# Patient Record
Sex: FEMALE | Race: WHITE | NOT HISPANIC OR LATINO | Employment: FULL TIME | URBAN - METROPOLITAN AREA
[De-identification: names, ages, dates, MRNs, and addresses within clinical notes are randomized per-mention and may not be internally consistent; named-entity substitution may affect disease eponyms.]

---

## 2018-07-20 ENCOUNTER — CLINICAL SUPPORT (OUTPATIENT)
Dept: OCCUPATIONAL MEDICINE | Facility: CLINIC | Age: 38
End: 2018-07-20

## 2018-07-20 DIAGNOSIS — Z02.83 ENCOUNTER FOR DRUG SCREENING: ICD-10-CM

## 2018-07-20 PROCEDURE — 80305 DRUG TEST PRSMV DIR OPT OBS: CPT | Mod: S$GLB,,, | Performed by: NURSE PRACTITIONER

## 2019-02-04 ENCOUNTER — OFFICE VISIT (OUTPATIENT)
Dept: OBSTETRICS AND GYNECOLOGY | Facility: CLINIC | Age: 39
End: 2019-02-04
Payer: COMMERCIAL

## 2019-02-04 VITALS
RESPIRATION RATE: 13 BRPM | HEIGHT: 62 IN | SYSTOLIC BLOOD PRESSURE: 122 MMHG | WEIGHT: 178 LBS | BODY MASS INDEX: 32.76 KG/M2 | DIASTOLIC BLOOD PRESSURE: 67 MMHG | HEART RATE: 79 BPM

## 2019-02-04 DIAGNOSIS — R45.1 AGITATION: ICD-10-CM

## 2019-02-04 DIAGNOSIS — R10.31 RIGHT LOWER QUADRANT PAIN: ICD-10-CM

## 2019-02-04 DIAGNOSIS — Z01.419 ENCOUNTER FOR GYNECOLOGICAL EXAMINATION WITHOUT ABNORMAL FINDING: Primary | ICD-10-CM

## 2019-02-04 DIAGNOSIS — Z12.4 CERVICAL CANCER SCREENING: ICD-10-CM

## 2019-02-04 PROCEDURE — 88175 CYTOPATH C/V AUTO FLUID REDO: CPT

## 2019-02-04 PROCEDURE — 99999 PR PBB SHADOW E&M-EST. PATIENT-LVL III: ICD-10-PCS | Mod: PBBFAC,,, | Performed by: OBSTETRICS & GYNECOLOGY

## 2019-02-04 PROCEDURE — 99999 PR PBB SHADOW E&M-EST. PATIENT-LVL III: CPT | Mod: PBBFAC,,, | Performed by: OBSTETRICS & GYNECOLOGY

## 2019-02-04 PROCEDURE — 99385 PR PREVENTIVE VISIT,NEW,18-39: ICD-10-PCS | Mod: S$GLB,,, | Performed by: OBSTETRICS & GYNECOLOGY

## 2019-02-04 PROCEDURE — 99385 PREV VISIT NEW AGE 18-39: CPT | Mod: S$GLB,,, | Performed by: OBSTETRICS & GYNECOLOGY

## 2019-02-04 RX ORDER — NORGESTIMATE AND ETHINYL ESTRADIOL 0.25-0.035
1 KIT ORAL DAILY
Qty: 28 TABLET | Refills: 12 | Status: SHIPPED | OUTPATIENT
Start: 2019-02-04 | End: 2021-05-07

## 2019-02-04 RX ORDER — CITALOPRAM 20 MG/1
20 TABLET, FILM COATED ORAL DAILY
Qty: 30 TABLET | Refills: 12 | Status: SHIPPED | OUTPATIENT
Start: 2019-02-04 | End: 2021-05-07

## 2019-02-04 NOTE — PROGRESS NOTES
Subjective:       Patient ID: Nakita Vaughn is a 38 y.o. female.    Chief Complaint:  Well Woman      History of Present Illness   patient presents for annual exam.  Patient admits to increasing agitation over the last several months.  Along with his she has started to notice occasional crying spells and change in her sleeping habits.  She denies any change in her dietary habits.  Patient has never had this condition for an never been on medication.  Counseling was done.  Patient is also complaining of pain which she describes as being over her ovaries.  She states she feels like her ovaries upon to fall out.  Patient states that she has much more of an issue on the right side than the left.  She states it is not only with ovulation.  She states that only last a few seconds and does not require   Pain medication.    Menstrual History:  OB History      Para Term  AB Living    3 3       3    SAB TAB Ectopic Multiple Live Births                      Menarche age:   Patient's last menstrual period was 2019 (approximate).         Review of Systems  Review of Systems   Constitutional: Negative for activity change, appetite change, chills, diaphoresis, fatigue, fever and unexpected weight change.   HENT: Negative for congestion, dental problem, drooling, ear discharge, ear pain, facial swelling, hearing loss, mouth sores, nosebleeds, postnasal drip, rhinorrhea, sinus pressure, sneezing, sore throat, tinnitus, trouble swallowing and voice change.    Eyes: Positive for visual disturbance. Negative for photophobia, pain, discharge, redness and itching.   Respiratory: Negative for apnea, cough, choking, chest tightness, shortness of breath, wheezing and stridor.    Cardiovascular: Negative for chest pain, palpitations and leg swelling.   Gastrointestinal: Negative for abdominal distention, abdominal pain, anal bleeding, blood in stool, constipation, diarrhea, nausea, rectal pain and vomiting.    Endocrine: Negative for cold intolerance, heat intolerance, polydipsia, polyphagia and polyuria.   Genitourinary: Positive for frequency and pelvic pain. Negative for decreased urine volume, difficulty urinating, dyspareunia, dysuria, enuresis, flank pain, genital sores, hematuria, menstrual problem, urgency, vaginal bleeding, vaginal discharge and vaginal pain.   Musculoskeletal: Negative for arthralgias, back pain, gait problem, joint swelling, myalgias, neck pain and neck stiffness.   Skin: Negative for color change, pallor, rash and wound.   Allergic/Immunologic: Negative for environmental allergies, food allergies and immunocompromised state.   Neurological: Negative for dizziness, tremors, seizures, syncope, facial asymmetry, speech difficulty, weakness, light-headedness, numbness and headaches.   Hematological: Negative for adenopathy. Does not bruise/bleed easily.   Psychiatric/Behavioral: Positive for agitation and sleep disturbance. Negative for behavioral problems, confusion, decreased concentration, dysphoric mood, hallucinations, self-injury and suicidal ideas. The patient is not nervous/anxious and is not hyperactive.            Objective:      Physical Exam   Constitutional: She is oriented to person, place, and time. She appears well-developed and well-nourished.   Neck: No thyromegaly present.   Cardiovascular: Normal rate and regular rhythm.   Pulmonary/Chest: Effort normal and breath sounds normal. Right breast exhibits no inverted nipple, no mass, no nipple discharge, no skin change and no tenderness. Left breast exhibits no inverted nipple, no mass, no nipple discharge, no skin change and no tenderness. Breasts are symmetrical.   Abdominal: Soft. Bowel sounds are normal. She exhibits no mass. There is no tenderness. Hernia confirmed negative in the right inguinal area and confirmed negative in the left inguinal area.   Genitourinary: Vagina normal and uterus normal. Rectal exam shows no  external hemorrhoid. No breast tenderness or discharge. Uterus is not enlarged and not tender. Cervix exhibits no motion tenderness, no discharge and no friability. Right adnexum displays no mass, no tenderness and no fullness. Left adnexum displays no mass, no tenderness and no fullness. No tenderness in the vagina. No foreign body in the vagina. No vaginal discharge found.   Musculoskeletal: Normal range of motion.   Lymphadenopathy:        Right: No inguinal adenopathy present.        Left: No inguinal adenopathy present.   Neurological: She is alert and oriented to person, place, and time. She has normal reflexes.   Skin: Skin is dry.   Psychiatric: She has a normal mood and affect. Her behavior is normal. Judgment and thought content normal.   Nursing note and vitals reviewed.          Assessment:        1. Encounter for gynecological examination without abnormal finding    2. Cervical cancer screening    3. Right lower quadrant pain    4. Agitation                Plan:         Nakita was seen today for well woman.    Diagnoses and all orders for this visit:    Encounter for gynecological examination without abnormal finding    Cervical cancer screening  -     Liquid-based pap smear, screening    Right lower quadrant pain    Agitation    Other orders  -     citalopram (CELEXA) 20 MG tablet; Take 1 tablet (20 mg total) by mouth once daily.

## 2021-04-30 ENCOUNTER — OFFICE VISIT (OUTPATIENT)
Dept: CARDIOLOGY | Facility: CLINIC | Age: 41
End: 2021-04-30
Payer: COMMERCIAL

## 2021-04-30 ENCOUNTER — HOSPITAL ENCOUNTER (OUTPATIENT)
Dept: CARDIOLOGY | Facility: HOSPITAL | Age: 41
Discharge: HOME | End: 2021-04-30
Attending: INTERNAL MEDICINE
Payer: COMMERCIAL

## 2021-04-30 VITALS
HEART RATE: 78 BPM | BODY MASS INDEX: 34.61 KG/M2 | OXYGEN SATURATION: 99 % | WEIGHT: 188.06 LBS | DIASTOLIC BLOOD PRESSURE: 84 MMHG | HEIGHT: 62 IN | SYSTOLIC BLOOD PRESSURE: 128 MMHG

## 2021-04-30 DIAGNOSIS — R51.9 GENERALIZED HEADACHES: ICD-10-CM

## 2021-04-30 DIAGNOSIS — R94.31 ABNORMAL EKG: ICD-10-CM

## 2021-04-30 DIAGNOSIS — H53.8 BLURRY VISION, BILATERAL: ICD-10-CM

## 2021-04-30 DIAGNOSIS — R07.9 CHEST PAIN SYNDROME: Primary | ICD-10-CM

## 2021-04-30 DIAGNOSIS — Z76.89 ESTABLISHING CARE WITH NEW DOCTOR, ENCOUNTER FOR: Primary | ICD-10-CM

## 2021-04-30 DIAGNOSIS — R06.02 SHORTNESS OF BREATH: ICD-10-CM

## 2021-04-30 DIAGNOSIS — R06.83 SNORING: ICD-10-CM

## 2021-04-30 DIAGNOSIS — Z76.89 ESTABLISHING CARE WITH NEW DOCTOR, ENCOUNTER FOR: ICD-10-CM

## 2021-04-30 PROCEDURE — 93005 ELECTROCARDIOGRAM TRACING: CPT

## 2021-04-30 PROCEDURE — 93010 EKG 12-LEAD: ICD-10-PCS | Mod: ,,, | Performed by: INTERNAL MEDICINE

## 2021-04-30 PROCEDURE — 99999 PR PBB SHADOW E&M-EST. PATIENT-LVL III: ICD-10-PCS | Mod: PBBFAC,,, | Performed by: INTERNAL MEDICINE

## 2021-04-30 PROCEDURE — 99204 PR OFFICE/OUTPT VISIT, NEW, LEVL IV, 45-59 MIN: ICD-10-PCS | Mod: S$GLB,,, | Performed by: INTERNAL MEDICINE

## 2021-04-30 PROCEDURE — 99999 PR PBB SHADOW E&M-EST. PATIENT-LVL III: CPT | Mod: PBBFAC,,, | Performed by: INTERNAL MEDICINE

## 2021-04-30 PROCEDURE — 93010 ELECTROCARDIOGRAM REPORT: CPT | Mod: ,,, | Performed by: INTERNAL MEDICINE

## 2021-04-30 PROCEDURE — 99204 OFFICE O/P NEW MOD 45 MIN: CPT | Mod: S$GLB,,, | Performed by: INTERNAL MEDICINE

## 2021-05-01 ENCOUNTER — LAB VISIT (OUTPATIENT)
Dept: LAB | Facility: HOSPITAL | Age: 41
End: 2021-05-01
Attending: INTERNAL MEDICINE
Payer: COMMERCIAL

## 2021-05-01 DIAGNOSIS — R94.31 ABNORMAL EKG: ICD-10-CM

## 2021-05-01 DIAGNOSIS — R06.02 SHORTNESS OF BREATH: ICD-10-CM

## 2021-05-01 DIAGNOSIS — R06.83 SNORING: ICD-10-CM

## 2021-05-01 DIAGNOSIS — R07.9 CHEST PAIN SYNDROME: ICD-10-CM

## 2021-05-01 LAB
ALBUMIN SERPL BCP-MCNC: 3.8 G/DL (ref 3.5–5.2)
ALP SERPL-CCNC: 56 U/L (ref 55–135)
ALT SERPL W/O P-5'-P-CCNC: 11 U/L (ref 10–44)
ANION GAP SERPL CALC-SCNC: 10 MMOL/L (ref 8–16)
AST SERPL-CCNC: 12 U/L (ref 10–40)
BASOPHILS # BLD AUTO: 0.04 K/UL (ref 0–0.2)
BASOPHILS NFR BLD: 0.6 % (ref 0–1.9)
BILIRUB SERPL-MCNC: 0.7 MG/DL (ref 0.1–1)
BNP SERPL-MCNC: <10 PG/ML (ref 0–99)
BUN SERPL-MCNC: 18 MG/DL (ref 6–20)
CALCIUM SERPL-MCNC: 9.4 MG/DL (ref 8.7–10.5)
CHLORIDE SERPL-SCNC: 109 MMOL/L (ref 95–110)
CHOLEST SERPL-MCNC: 169 MG/DL (ref 120–199)
CHOLEST/HDLC SERPL: 3.8 {RATIO} (ref 2–5)
CO2 SERPL-SCNC: 20 MMOL/L (ref 23–29)
CREAT SERPL-MCNC: 0.8 MG/DL (ref 0.5–1.4)
DIFFERENTIAL METHOD: NORMAL
EOSINOPHIL # BLD AUTO: 0.2 K/UL (ref 0–0.5)
EOSINOPHIL NFR BLD: 2.9 % (ref 0–8)
ERYTHROCYTE [DISTWIDTH] IN BLOOD BY AUTOMATED COUNT: 13.3 % (ref 11.5–14.5)
EST. GFR  (AFRICAN AMERICAN): >60 ML/MIN/1.73 M^2
EST. GFR  (NON AFRICAN AMERICAN): >60 ML/MIN/1.73 M^2
GLUCOSE SERPL-MCNC: 100 MG/DL (ref 70–110)
HCT VFR BLD AUTO: 38.2 % (ref 37–48.5)
HDLC SERPL-MCNC: 44 MG/DL (ref 40–75)
HDLC SERPL: 26 % (ref 20–50)
HGB BLD-MCNC: 12.4 G/DL (ref 12–16)
IMM GRANULOCYTES # BLD AUTO: 0.02 K/UL (ref 0–0.04)
IMM GRANULOCYTES NFR BLD AUTO: 0.3 % (ref 0–0.5)
LDLC SERPL CALC-MCNC: 114.6 MG/DL (ref 63–159)
LYMPHOCYTES # BLD AUTO: 1.7 K/UL (ref 1–4.8)
LYMPHOCYTES NFR BLD: 26.5 % (ref 18–48)
MCH RBC QN AUTO: 27.5 PG (ref 27–31)
MCHC RBC AUTO-ENTMCNC: 32.5 G/DL (ref 32–36)
MCV RBC AUTO: 85 FL (ref 82–98)
MONOCYTES # BLD AUTO: 0.5 K/UL (ref 0.3–1)
MONOCYTES NFR BLD: 7 % (ref 4–15)
NEUTROPHILS # BLD AUTO: 4.1 K/UL (ref 1.8–7.7)
NEUTROPHILS NFR BLD: 62.7 % (ref 38–73)
NONHDLC SERPL-MCNC: 125 MG/DL
NRBC BLD-RTO: 0 /100 WBC
PLATELET # BLD AUTO: 203 K/UL (ref 150–450)
PMV BLD AUTO: 11.4 FL (ref 9.2–12.9)
POTASSIUM SERPL-SCNC: 4.2 MMOL/L (ref 3.5–5.1)
PROT SERPL-MCNC: 6.7 G/DL (ref 6–8.4)
RBC # BLD AUTO: 4.51 M/UL (ref 4–5.4)
SODIUM SERPL-SCNC: 139 MMOL/L (ref 136–145)
TRIGL SERPL-MCNC: 52 MG/DL (ref 30–150)
TSH SERPL DL<=0.005 MIU/L-ACNC: 1.66 UIU/ML (ref 0.4–4)
WBC # BLD AUTO: 6.57 K/UL (ref 3.9–12.7)

## 2021-05-01 PROCEDURE — 84443 ASSAY THYROID STIM HORMONE: CPT | Performed by: INTERNAL MEDICINE

## 2021-05-01 PROCEDURE — 80053 COMPREHEN METABOLIC PANEL: CPT | Performed by: INTERNAL MEDICINE

## 2021-05-01 PROCEDURE — 83880 ASSAY OF NATRIURETIC PEPTIDE: CPT | Performed by: INTERNAL MEDICINE

## 2021-05-01 PROCEDURE — 36415 COLL VENOUS BLD VENIPUNCTURE: CPT | Performed by: INTERNAL MEDICINE

## 2021-05-01 PROCEDURE — 85025 COMPLETE CBC W/AUTO DIFF WBC: CPT | Performed by: INTERNAL MEDICINE

## 2021-05-01 PROCEDURE — 80061 LIPID PANEL: CPT | Performed by: INTERNAL MEDICINE

## 2021-05-03 ENCOUNTER — TELEPHONE (OUTPATIENT)
Dept: CARDIOLOGY | Facility: CLINIC | Age: 41
End: 2021-05-03

## 2021-05-07 ENCOUNTER — HOSPITAL ENCOUNTER (OUTPATIENT)
Dept: RADIOLOGY | Facility: HOSPITAL | Age: 41
Discharge: HOME | End: 2021-05-07
Attending: FAMILY MEDICINE
Payer: COMMERCIAL

## 2021-05-07 ENCOUNTER — OFFICE VISIT (OUTPATIENT)
Dept: INTERNAL MEDICINE | Facility: CLINIC | Age: 41
End: 2021-05-07
Payer: COMMERCIAL

## 2021-05-07 ENCOUNTER — OFFICE VISIT (OUTPATIENT)
Dept: OPHTHALMOLOGY | Facility: CLINIC | Age: 41
End: 2021-05-07
Payer: COMMERCIAL

## 2021-05-07 ENCOUNTER — PATIENT MESSAGE (OUTPATIENT)
Dept: PULMONOLOGY | Facility: CLINIC | Age: 41
End: 2021-05-07

## 2021-05-07 VITALS
OXYGEN SATURATION: 97 % | HEIGHT: 63 IN | HEART RATE: 72 BPM | SYSTOLIC BLOOD PRESSURE: 120 MMHG | WEIGHT: 188.06 LBS | TEMPERATURE: 98 F | DIASTOLIC BLOOD PRESSURE: 82 MMHG | BODY MASS INDEX: 33.32 KG/M2 | RESPIRATION RATE: 16 BRPM

## 2021-05-07 DIAGNOSIS — Z12.31 SCREENING MAMMOGRAM, ENCOUNTER FOR: ICD-10-CM

## 2021-05-07 DIAGNOSIS — H04.123 DRY EYES, BILATERAL: ICD-10-CM

## 2021-05-07 DIAGNOSIS — M35.01 KERATOCONJUNCTIVITIS SICCA: Primary | ICD-10-CM

## 2021-05-07 DIAGNOSIS — R06.02 SOB (SHORTNESS OF BREATH): ICD-10-CM

## 2021-05-07 DIAGNOSIS — H53.022 REFRACTIVE AMBLYOPIA OF LEFT EYE: ICD-10-CM

## 2021-05-07 DIAGNOSIS — R06.83 SNORING: ICD-10-CM

## 2021-05-07 DIAGNOSIS — R06.02 SOB (SHORTNESS OF BREATH): Primary | ICD-10-CM

## 2021-05-07 DIAGNOSIS — H52.03 LATENT HYPEROPIA OF BOTH EYES: ICD-10-CM

## 2021-05-07 DIAGNOSIS — R51.9 GENERALIZED HEADACHES: ICD-10-CM

## 2021-05-07 PROCEDURE — 1126F AMNT PAIN NOTED NONE PRSNT: CPT | Mod: S$GLB,,, | Performed by: FAMILY MEDICINE

## 2021-05-07 PROCEDURE — 71046 X-RAY EXAM CHEST 2 VIEWS: CPT | Mod: 26,,, | Performed by: RADIOLOGY

## 2021-05-07 PROCEDURE — 71046 X-RAY EXAM CHEST 2 VIEWS: CPT | Mod: TC,FY,PO

## 2021-05-07 PROCEDURE — 1126F PR PAIN SEVERITY QUANTIFIED, NO PAIN PRESENT: ICD-10-PCS | Mod: S$GLB,,, | Performed by: FAMILY MEDICINE

## 2021-05-07 PROCEDURE — 77067 SCR MAMMO BI INCL CAD: CPT | Mod: 26,,, | Performed by: RADIOLOGY

## 2021-05-07 PROCEDURE — 3008F BODY MASS INDEX DOCD: CPT | Mod: CPTII,S$GLB,, | Performed by: FAMILY MEDICINE

## 2021-05-07 PROCEDURE — 99999 PR PBB SHADOW E&M-EST. PATIENT-LVL II: CPT | Mod: PBBFAC,,, | Performed by: OPTOMETRIST

## 2021-05-07 PROCEDURE — 92004 COMPRE OPH EXAM NEW PT 1/>: CPT | Mod: S$GLB,,, | Performed by: OPTOMETRIST

## 2021-05-07 PROCEDURE — 92004 PR EYE EXAM, NEW PATIENT,COMPREHESV: ICD-10-PCS | Mod: S$GLB,,, | Performed by: OPTOMETRIST

## 2021-05-07 PROCEDURE — 77067 SCR MAMMO BI INCL CAD: CPT | Mod: TC

## 2021-05-07 PROCEDURE — 77063 BREAST TOMOSYNTHESIS BI: CPT | Mod: 26,,, | Performed by: RADIOLOGY

## 2021-05-07 PROCEDURE — 99999 PR PBB SHADOW E&M-EST. PATIENT-LVL IV: ICD-10-PCS | Mod: PBBFAC,,, | Performed by: FAMILY MEDICINE

## 2021-05-07 PROCEDURE — 99204 OFFICE O/P NEW MOD 45 MIN: CPT | Mod: S$GLB,,, | Performed by: FAMILY MEDICINE

## 2021-05-07 PROCEDURE — 92015 PR REFRACTION: ICD-10-PCS | Mod: S$GLB,,, | Performed by: OPTOMETRIST

## 2021-05-07 PROCEDURE — 71046 XR CHEST PA AND LATERAL: ICD-10-PCS | Mod: 26,,, | Performed by: RADIOLOGY

## 2021-05-07 PROCEDURE — 92015 DETERMINE REFRACTIVE STATE: CPT | Mod: S$GLB,,, | Performed by: OPTOMETRIST

## 2021-05-07 PROCEDURE — 99204 PR OFFICE/OUTPT VISIT, NEW, LEVL IV, 45-59 MIN: ICD-10-PCS | Mod: S$GLB,,, | Performed by: FAMILY MEDICINE

## 2021-05-07 PROCEDURE — 99999 PR PBB SHADOW E&M-EST. PATIENT-LVL II: ICD-10-PCS | Mod: PBBFAC,,, | Performed by: OPTOMETRIST

## 2021-05-07 PROCEDURE — 77067 MAMMO DIGITAL SCREENING BILAT WITH TOMO: ICD-10-PCS | Mod: 26,,, | Performed by: RADIOLOGY

## 2021-05-07 PROCEDURE — 99999 PR PBB SHADOW E&M-EST. PATIENT-LVL IV: CPT | Mod: PBBFAC,,, | Performed by: FAMILY MEDICINE

## 2021-05-07 PROCEDURE — 3008F PR BODY MASS INDEX (BMI) DOCUMENTED: ICD-10-PCS | Mod: CPTII,S$GLB,, | Performed by: FAMILY MEDICINE

## 2021-05-07 PROCEDURE — 77063 MAMMO DIGITAL SCREENING BILAT WITH TOMO: ICD-10-PCS | Mod: 26,,, | Performed by: RADIOLOGY

## 2021-05-07 RX ORDER — ALBUTEROL SULFATE 90 UG/1
2 AEROSOL, METERED RESPIRATORY (INHALATION) EVERY 6 HOURS PRN
Qty: 18 G | Refills: 0 | Status: SHIPPED | OUTPATIENT
Start: 2021-05-07 | End: 2021-06-16 | Stop reason: SDUPTHER

## 2021-05-07 RX ORDER — CYCLOSPORINE 0.5 MG/ML
1 EMULSION OPHTHALMIC 2 TIMES DAILY
Qty: 180 EACH | Refills: 0 | Status: SHIPPED | OUTPATIENT
Start: 2021-05-07 | End: 2023-10-06

## 2021-05-17 ENCOUNTER — PATIENT MESSAGE (OUTPATIENT)
Dept: CARDIOLOGY | Facility: HOSPITAL | Age: 41
End: 2021-05-17

## 2021-06-09 ENCOUNTER — DOCUMENTATION ONLY (OUTPATIENT)
Dept: PULMONOLOGY | Facility: CLINIC | Age: 41
End: 2021-06-09

## 2021-06-10 ENCOUNTER — OFFICE VISIT (OUTPATIENT)
Dept: PULMONOLOGY | Facility: CLINIC | Age: 41
End: 2021-06-10
Payer: COMMERCIAL

## 2021-06-10 VITALS
OXYGEN SATURATION: 98 % | HEART RATE: 89 BPM | SYSTOLIC BLOOD PRESSURE: 130 MMHG | DIASTOLIC BLOOD PRESSURE: 78 MMHG | HEIGHT: 63 IN | RESPIRATION RATE: 16 BRPM | BODY MASS INDEX: 33.71 KG/M2 | WEIGHT: 190.25 LBS

## 2021-06-10 DIAGNOSIS — R07.89 ATYPICAL CHEST PAIN: Primary | ICD-10-CM

## 2021-06-10 DIAGNOSIS — R05.8 DRY COUGH: ICD-10-CM

## 2021-06-10 DIAGNOSIS — R06.02 SOB (SHORTNESS OF BREATH): ICD-10-CM

## 2021-06-10 PROCEDURE — 99204 PR OFFICE/OUTPT VISIT, NEW, LEVL IV, 45-59 MIN: ICD-10-PCS | Mod: S$GLB,,, | Performed by: NURSE PRACTITIONER

## 2021-06-10 PROCEDURE — 3008F BODY MASS INDEX DOCD: CPT | Mod: CPTII,S$GLB,, | Performed by: NURSE PRACTITIONER

## 2021-06-10 PROCEDURE — 3008F PR BODY MASS INDEX (BMI) DOCUMENTED: ICD-10-PCS | Mod: CPTII,S$GLB,, | Performed by: NURSE PRACTITIONER

## 2021-06-10 PROCEDURE — 99999 PR PBB SHADOW E&M-EST. PATIENT-LVL III: CPT | Mod: PBBFAC,,, | Performed by: NURSE PRACTITIONER

## 2021-06-10 PROCEDURE — 99204 OFFICE O/P NEW MOD 45 MIN: CPT | Mod: S$GLB,,, | Performed by: NURSE PRACTITIONER

## 2021-06-10 PROCEDURE — 99999 PR PBB SHADOW E&M-EST. PATIENT-LVL III: ICD-10-PCS | Mod: PBBFAC,,, | Performed by: NURSE PRACTITIONER

## 2021-06-11 DIAGNOSIS — R06.02 SOB (SHORTNESS OF BREATH): Primary | ICD-10-CM

## 2021-06-12 ENCOUNTER — LAB VISIT (OUTPATIENT)
Dept: OTOLARYNGOLOGY | Facility: CLINIC | Age: 41
End: 2021-06-12
Payer: COMMERCIAL

## 2021-06-12 DIAGNOSIS — R06.02 SOB (SHORTNESS OF BREATH): ICD-10-CM

## 2021-06-12 PROCEDURE — U0005 INFEC AGEN DETEC AMPLI PROBE: HCPCS | Performed by: NURSE PRACTITIONER

## 2021-06-12 PROCEDURE — U0003 INFECTIOUS AGENT DETECTION BY NUCLEIC ACID (DNA OR RNA); SEVERE ACUTE RESPIRATORY SYNDROME CORONAVIRUS 2 (SARS-COV-2) (CORONAVIRUS DISEASE [COVID-19]), AMPLIFIED PROBE TECHNIQUE, MAKING USE OF HIGH THROUGHPUT TECHNOLOGIES AS DESCRIBED BY CMS-2020-01-R: HCPCS | Performed by: NURSE PRACTITIONER

## 2021-06-14 LAB — SARS-COV-2 RNA RESP QL NAA+PROBE: NOT DETECTED

## 2021-06-15 ENCOUNTER — CLINICAL SUPPORT (OUTPATIENT)
Dept: PULMONOLOGY | Facility: CLINIC | Age: 41
End: 2021-06-15
Payer: COMMERCIAL

## 2021-06-15 DIAGNOSIS — R06.02 SOB (SHORTNESS OF BREATH): ICD-10-CM

## 2021-06-15 LAB
BRPFT: NORMAL
BRPFT: NORMAL
DLCO ADJ PRE: 15.81 ML/(MIN*MMHG)
DLCO SINGLE BREATH LLN: 19.31
DLCO SINGLE BREATH PRE REF: 63.1 %
DLCO SINGLE BREATH REF: 25.05
DLCOC SBVA LLN: 3.63
DLCOC SBVA PRE REF: 96.9 %
DLCOC SBVA REF: 5.25
DLCOC SINGLE BREATH LLN: 19.31
DLCOC SINGLE BREATH PRE REF: 63.1 %
DLCOC SINGLE BREATH REF: 25.05
DLCOVA LLN: 3.63
DLCOVA PRE REF: 96.9 %
DLCOVA PRE: 5.09 ML/(MIN*MMHG*L)
DLCOVA REF: 5.25
DLVAADJ PRE: 5.09 ML/(MIN*MMHG*L)
ERV LLN: -16448.91
ERV PRE REF: 26.4 %
ERV REF: 1.09
FEF 25 75 LLN: 1.87
FEF 25 75 PRE REF: 73.3 %
FEF 25 75 REF: 3.09
FEV1 FVC LLN: 71
FEV1 FVC PRE REF: 97.3 %
FEV1 FVC REF: 82
FEV1 LLN: 2.31
FEV1 PRE REF: 75.8 %
FEV1 REF: 2.91
FRCPLETH LLN: 1.8
FRCPLETH PREREF: 67.5 %
FRCPLETH REF: 2.62
FVC LLN: 2.83
FVC PRE REF: 77.6 %
FVC REF: 3.54
IVC PRE: 2.15 L
IVC SINGLE BREATH LLN: 2.83
IVC SINGLE BREATH PRE REF: 60.6 %
IVC SINGLE BREATH REF: 3.54
MVV LLN: 92
MVV PRE REF: 41.4 %
MVV REF: 109
PEF LLN: 5.16
PEF PRE REF: 67.3 %
PEF REF: 6.82
PRE DLCO: 15.81 ML/(MIN*MMHG)
PRE ERV: 0.29 L
PRE FEF 25 75: 2.27 L/S
PRE FET 100: 6.88 SEC
PRE FEV1 FVC: 80.14 %
PRE FEV1: 2.2 L
PRE FRC PL: 1.77 L
PRE FVC: 2.75 L
PRE MVV: 45 L/MIN
PRE PEF: 4.59 L/S
PRE RV: 0.93 L
PRE TLC: 3.75 L
RAW LLN: 3.06
RAW PRE REF: 97.6 %
RAW PRE: 2.99 CMH2O*S/L
RAW REF: 3.06
RV LLN: 0.96
RV PRE REF: 60.8 %
RV REF: 1.54
RVTLC LLN: 23
RVTLC PRE REF: 76.5 %
RVTLC PRE: 24.9 %
RVTLC REF: 33
TLC LLN: 3.78
TLC PRE REF: 78.6 %
TLC REF: 4.77
VA PRE: 3.11 L
VA SINGLE BREATH LLN: 4.62
VA SINGLE BREATH PRE REF: 67.4 %
VA SINGLE BREATH REF: 4.62
VC LLN: 2.83
VC PRE REF: 79.5 %
VC PRE: 2.82 L
VC REF: 3.54
VTGRAWPRE: 2.65 L

## 2021-06-15 PROCEDURE — 94010 BREATHING CAPACITY TEST: ICD-10-PCS | Mod: S$GLB,,, | Performed by: INTERNAL MEDICINE

## 2021-06-15 PROCEDURE — 94729 DIFFUSING CAPACITY: CPT | Mod: S$GLB,,, | Performed by: INTERNAL MEDICINE

## 2021-06-15 PROCEDURE — 94726 PULM FUNCT TST PLETHYSMOGRAP: ICD-10-PCS | Mod: S$GLB,,, | Performed by: INTERNAL MEDICINE

## 2021-06-15 PROCEDURE — 94729 PR C02/MEMBANE DIFFUSE CAPACITY: ICD-10-PCS | Mod: S$GLB,,, | Performed by: INTERNAL MEDICINE

## 2021-06-15 PROCEDURE — 94726 PLETHYSMOGRAPHY LUNG VOLUMES: CPT | Mod: S$GLB,,, | Performed by: INTERNAL MEDICINE

## 2021-06-15 PROCEDURE — 94010 BREATHING CAPACITY TEST: CPT | Mod: S$GLB,,, | Performed by: INTERNAL MEDICINE

## 2021-06-16 ENCOUNTER — OFFICE VISIT (OUTPATIENT)
Dept: SLEEP MEDICINE | Facility: CLINIC | Age: 41
End: 2021-06-16
Payer: COMMERCIAL

## 2021-06-16 VITALS — HEIGHT: 63 IN | WEIGHT: 190 LBS | BODY MASS INDEX: 33.66 KG/M2

## 2021-06-16 DIAGNOSIS — R05.8 DRY COUGH: ICD-10-CM

## 2021-06-16 DIAGNOSIS — J45.909 ASTHMA, UNSPECIFIED ASTHMA SEVERITY, UNSPECIFIED WHETHER COMPLICATED, UNSPECIFIED WHETHER PERSISTENT: ICD-10-CM

## 2021-06-16 DIAGNOSIS — R06.02 SOB (SHORTNESS OF BREATH): Primary | ICD-10-CM

## 2021-06-16 PROCEDURE — 99214 PR OFFICE/OUTPT VISIT, EST, LEVL IV, 30-39 MIN: ICD-10-PCS | Mod: 95,,, | Performed by: NURSE PRACTITIONER

## 2021-06-16 PROCEDURE — 3008F PR BODY MASS INDEX (BMI) DOCUMENTED: ICD-10-PCS | Mod: CPTII,,, | Performed by: NURSE PRACTITIONER

## 2021-06-16 PROCEDURE — 3008F BODY MASS INDEX DOCD: CPT | Mod: CPTII,,, | Performed by: NURSE PRACTITIONER

## 2021-06-16 PROCEDURE — 99214 OFFICE O/P EST MOD 30 MIN: CPT | Mod: 95,,, | Performed by: NURSE PRACTITIONER

## 2021-06-16 RX ORDER — ALBUTEROL SULFATE 90 UG/1
2 AEROSOL, METERED RESPIRATORY (INHALATION) EVERY 6 HOURS PRN
Qty: 18 G | Refills: 0 | Status: SHIPPED | OUTPATIENT
Start: 2021-06-16 | End: 2023-10-06

## 2021-06-16 RX ORDER — FLUTICASONE FUROATE AND VILANTEROL TRIFENATATE 100; 25 UG/1; UG/1
1 POWDER RESPIRATORY (INHALATION) DAILY
Qty: 1 EACH | Refills: 11 | Status: SHIPPED | OUTPATIENT
Start: 2021-06-16 | End: 2021-07-21 | Stop reason: SDUPTHER

## 2021-08-27 ENCOUNTER — PATIENT MESSAGE (OUTPATIENT)
Dept: PULMONOLOGY | Facility: CLINIC | Age: 41
End: 2021-08-27

## 2022-04-27 ENCOUNTER — PATIENT MESSAGE (OUTPATIENT)
Dept: ADMINISTRATIVE | Facility: HOSPITAL | Age: 42
End: 2022-04-27
Payer: COMMERCIAL

## 2022-06-07 DIAGNOSIS — Z12.31 OTHER SCREENING MAMMOGRAM: ICD-10-CM

## 2022-09-08 ENCOUNTER — PATIENT OUTREACH (OUTPATIENT)
Dept: ADMINISTRATIVE | Facility: HOSPITAL | Age: 42
End: 2022-09-08
Payer: COMMERCIAL

## 2023-01-11 ENCOUNTER — HOSPITAL ENCOUNTER (OUTPATIENT)
Dept: RADIOLOGY | Facility: HOSPITAL | Age: 43
Discharge: HOME | End: 2023-01-11
Attending: FAMILY MEDICINE
Payer: COMMERCIAL

## 2023-01-11 VITALS — HEIGHT: 63 IN | WEIGHT: 190.06 LBS | BODY MASS INDEX: 33.68 KG/M2

## 2023-01-11 DIAGNOSIS — Z12.31 OTHER SCREENING MAMMOGRAM: ICD-10-CM

## 2023-01-11 PROCEDURE — 77067 SCR MAMMO BI INCL CAD: CPT | Mod: TC,PO

## 2023-01-11 PROCEDURE — 77063 BREAST TOMOSYNTHESIS BI: CPT | Mod: 26,,, | Performed by: RADIOLOGY

## 2023-01-11 PROCEDURE — 77067 SCR MAMMO BI INCL CAD: CPT | Mod: 26,,, | Performed by: RADIOLOGY

## 2023-01-11 PROCEDURE — 77067 MAMMO DIGITAL SCREENING BILAT WITH TOMO: ICD-10-PCS | Mod: 26,,, | Performed by: RADIOLOGY

## 2023-01-11 PROCEDURE — 77063 MAMMO DIGITAL SCREENING BILAT WITH TOMO: ICD-10-PCS | Mod: 26,,, | Performed by: RADIOLOGY

## 2023-04-19 ENCOUNTER — PATIENT MESSAGE (OUTPATIENT)
Dept: ADMINISTRATIVE | Facility: HOSPITAL | Age: 43
End: 2023-04-19
Payer: COMMERCIAL

## 2023-10-06 ENCOUNTER — OFFICE VISIT (OUTPATIENT)
Dept: OBSTETRICS AND GYNECOLOGY | Facility: CLINIC | Age: 43
End: 2023-10-06
Payer: COMMERCIAL

## 2023-10-06 ENCOUNTER — OFFICE VISIT (OUTPATIENT)
Dept: INTERNAL MEDICINE | Facility: CLINIC | Age: 43
End: 2023-10-06
Payer: COMMERCIAL

## 2023-10-06 ENCOUNTER — LAB VISIT (OUTPATIENT)
Dept: LAB | Facility: HOSPITAL | Age: 43
End: 2023-10-06
Attending: NURSE PRACTITIONER
Payer: COMMERCIAL

## 2023-10-06 VITALS
WEIGHT: 192.69 LBS | SYSTOLIC BLOOD PRESSURE: 122 MMHG | BODY MASS INDEX: 34.14 KG/M2 | DIASTOLIC BLOOD PRESSURE: 84 MMHG | HEIGHT: 63 IN

## 2023-10-06 VITALS
BODY MASS INDEX: 34.25 KG/M2 | DIASTOLIC BLOOD PRESSURE: 82 MMHG | HEIGHT: 63 IN | TEMPERATURE: 98 F | SYSTOLIC BLOOD PRESSURE: 102 MMHG | HEART RATE: 100 BPM | WEIGHT: 193.31 LBS

## 2023-10-06 DIAGNOSIS — N92.6 IRREGULAR MENSTRUAL CYCLE: ICD-10-CM

## 2023-10-06 DIAGNOSIS — L68.0 HIRSUTISM: ICD-10-CM

## 2023-10-06 DIAGNOSIS — R10.31 RLQ ABDOMINAL PAIN: ICD-10-CM

## 2023-10-06 DIAGNOSIS — Z12.4 ENCOUNTER FOR SCREENING FOR CERVICAL CANCER: ICD-10-CM

## 2023-10-06 DIAGNOSIS — Z00.00 ANNUAL PHYSICAL EXAM: Primary | ICD-10-CM

## 2023-10-06 DIAGNOSIS — Z00.00 ANNUAL PHYSICAL EXAM: ICD-10-CM

## 2023-10-06 DIAGNOSIS — Z01.419 ENCOUNTER FOR GYNECOLOGICAL EXAMINATION WITHOUT ABNORMAL FINDING: Primary | ICD-10-CM

## 2023-10-06 DIAGNOSIS — Z12.31 ENCOUNTER FOR SCREENING MAMMOGRAM FOR MALIGNANT NEOPLASM OF BREAST: ICD-10-CM

## 2023-10-06 PROBLEM — R06.83 SNORING: Status: RESOLVED | Noted: 2021-04-30 | Resolved: 2023-10-06

## 2023-10-06 LAB
ALBUMIN SERPL BCP-MCNC: 3.9 G/DL (ref 3.5–5.2)
ALP SERPL-CCNC: 53 U/L (ref 55–135)
ALT SERPL W/O P-5'-P-CCNC: 14 U/L (ref 10–44)
ANION GAP SERPL CALC-SCNC: 6 MMOL/L (ref 8–16)
AST SERPL-CCNC: 14 U/L (ref 10–40)
BASOPHILS # BLD AUTO: 0.05 K/UL (ref 0–0.2)
BASOPHILS NFR BLD: 1.1 % (ref 0–1.9)
BILIRUB SERPL-MCNC: 0.9 MG/DL (ref 0.1–1)
BUN SERPL-MCNC: 16 MG/DL (ref 6–20)
CALCIUM SERPL-MCNC: 8.9 MG/DL (ref 8.7–10.5)
CHLORIDE SERPL-SCNC: 110 MMOL/L (ref 95–110)
CHOLEST SERPL-MCNC: 173 MG/DL (ref 120–199)
CHOLEST/HDLC SERPL: 4.2 {RATIO} (ref 2–5)
CO2 SERPL-SCNC: 23 MMOL/L (ref 23–29)
CREAT SERPL-MCNC: 0.8 MG/DL (ref 0.5–1.4)
DHEA-S SERPL-MCNC: 157.7 UG/DL (ref 74.8–410.2)
DIFFERENTIAL METHOD: ABNORMAL
EOSINOPHIL # BLD AUTO: 0.1 K/UL (ref 0–0.5)
EOSINOPHIL NFR BLD: 2.6 % (ref 0–8)
ERYTHROCYTE [DISTWIDTH] IN BLOOD BY AUTOMATED COUNT: 13.2 % (ref 11.5–14.5)
EST. GFR  (NO RACE VARIABLE): >60 ML/MIN/1.73 M^2
ESTIMATED AVG GLUCOSE: 111 MG/DL (ref 68–131)
FSH SERPL-ACNC: 4.68 MIU/ML
GLUCOSE SERPL-MCNC: 93 MG/DL (ref 70–110)
HBA1C MFR BLD: 5.5 % (ref 4–5.6)
HCT VFR BLD AUTO: 36.9 % (ref 37–48.5)
HCV AB SERPL QL IA: NORMAL
HDLC SERPL-MCNC: 41 MG/DL (ref 40–75)
HDLC SERPL: 23.7 % (ref 20–50)
HGB BLD-MCNC: 11.8 G/DL (ref 12–16)
IMM GRANULOCYTES # BLD AUTO: 0.01 K/UL (ref 0–0.04)
IMM GRANULOCYTES NFR BLD AUTO: 0.2 % (ref 0–0.5)
LDLC SERPL CALC-MCNC: 116.8 MG/DL (ref 63–159)
LH SERPL-ACNC: 2.9 MIU/ML
LYMPHOCYTES # BLD AUTO: 1.5 K/UL (ref 1–4.8)
LYMPHOCYTES NFR BLD: 33.8 % (ref 18–48)
MCH RBC QN AUTO: 27.3 PG (ref 27–31)
MCHC RBC AUTO-ENTMCNC: 32 G/DL (ref 32–36)
MCV RBC AUTO: 85 FL (ref 82–98)
MONOCYTES # BLD AUTO: 0.4 K/UL (ref 0.3–1)
MONOCYTES NFR BLD: 7.7 % (ref 4–15)
NEUTROPHILS # BLD AUTO: 2.5 K/UL (ref 1.8–7.7)
NEUTROPHILS NFR BLD: 54.6 % (ref 38–73)
NONHDLC SERPL-MCNC: 132 MG/DL
NRBC BLD-RTO: 0 /100 WBC
PLATELET # BLD AUTO: 225 K/UL (ref 150–450)
PMV BLD AUTO: 10.4 FL (ref 9.2–12.9)
POTASSIUM SERPL-SCNC: 4.2 MMOL/L (ref 3.5–5.1)
PROLACTIN SERPL IA-MCNC: 9.1 NG/ML (ref 5.2–26.5)
PROT SERPL-MCNC: 6.6 G/DL (ref 6–8.4)
RBC # BLD AUTO: 4.32 M/UL (ref 4–5.4)
SODIUM SERPL-SCNC: 139 MMOL/L (ref 136–145)
TESTOST SERPL-MCNC: 21 NG/DL (ref 5–73)
TRIGL SERPL-MCNC: 76 MG/DL (ref 30–150)
TSH SERPL DL<=0.005 MIU/L-ACNC: 1.12 UIU/ML (ref 0.4–4)
WBC # BLD AUTO: 4.56 K/UL (ref 3.9–12.7)

## 2023-10-06 PROCEDURE — 3074F PR MOST RECENT SYSTOLIC BLOOD PRESSURE < 130 MM HG: ICD-10-PCS | Mod: CPTII,S$GLB,, | Performed by: NURSE PRACTITIONER

## 2023-10-06 PROCEDURE — 83002 ASSAY OF GONADOTROPIN (LH): CPT | Performed by: NURSE PRACTITIONER

## 2023-10-06 PROCEDURE — 99999 PR PBB SHADOW E&M-EST. PATIENT-LVL III: ICD-10-PCS | Mod: PBBFAC,,, | Performed by: NURSE PRACTITIONER

## 2023-10-06 PROCEDURE — 84443 ASSAY THYROID STIM HORMONE: CPT | Performed by: NURSE PRACTITIONER

## 2023-10-06 PROCEDURE — 3008F BODY MASS INDEX DOCD: CPT | Mod: CPTII,S$GLB,, | Performed by: NURSE PRACTITIONER

## 2023-10-06 PROCEDURE — 80053 COMPREHEN METABOLIC PANEL: CPT | Performed by: NURSE PRACTITIONER

## 2023-10-06 PROCEDURE — 88175 CYTOPATH C/V AUTO FLUID REDO: CPT | Performed by: NURSE PRACTITIONER

## 2023-10-06 PROCEDURE — 3074F SYST BP LT 130 MM HG: CPT | Mod: CPTII,S$GLB,, | Performed by: NURSE PRACTITIONER

## 2023-10-06 PROCEDURE — 85025 COMPLETE CBC W/AUTO DIFF WBC: CPT | Performed by: NURSE PRACTITIONER

## 2023-10-06 PROCEDURE — 84403 ASSAY OF TOTAL TESTOSTERONE: CPT | Performed by: NURSE PRACTITIONER

## 2023-10-06 PROCEDURE — 36415 COLL VENOUS BLD VENIPUNCTURE: CPT | Mod: PO | Performed by: NURSE PRACTITIONER

## 2023-10-06 PROCEDURE — 99999 PR PBB SHADOW E&M-EST. PATIENT-LVL III: CPT | Mod: PBBFAC,,, | Performed by: NURSE PRACTITIONER

## 2023-10-06 PROCEDURE — 1160F PR REVIEW ALL MEDS BY PRESCRIBER/CLIN PHARMACIST DOCUMENTED: ICD-10-PCS | Mod: CPTII,S$GLB,, | Performed by: NURSE PRACTITIONER

## 2023-10-06 PROCEDURE — 1159F MED LIST DOCD IN RCRD: CPT | Mod: CPTII,S$GLB,, | Performed by: NURSE PRACTITIONER

## 2023-10-06 PROCEDURE — 87624 HPV HI-RISK TYP POOLED RSLT: CPT | Performed by: NURSE PRACTITIONER

## 2023-10-06 PROCEDURE — 3079F PR MOST RECENT DIASTOLIC BLOOD PRESSURE 80-89 MM HG: ICD-10-PCS | Mod: CPTII,S$GLB,, | Performed by: NURSE PRACTITIONER

## 2023-10-06 PROCEDURE — 1160F RVW MEDS BY RX/DR IN RCRD: CPT | Mod: CPTII,S$GLB,, | Performed by: NURSE PRACTITIONER

## 2023-10-06 PROCEDURE — 82627 DEHYDROEPIANDROSTERONE: CPT | Performed by: NURSE PRACTITIONER

## 2023-10-06 PROCEDURE — 3079F DIAST BP 80-89 MM HG: CPT | Mod: CPTII,S$GLB,, | Performed by: NURSE PRACTITIONER

## 2023-10-06 PROCEDURE — 3044F HG A1C LEVEL LT 7.0%: CPT | Mod: CPTII,S$GLB,, | Performed by: NURSE PRACTITIONER

## 2023-10-06 PROCEDURE — 99386 PREV VISIT NEW AGE 40-64: CPT | Mod: S$GLB,,, | Performed by: NURSE PRACTITIONER

## 2023-10-06 PROCEDURE — 3008F PR BODY MASS INDEX (BMI) DOCUMENTED: ICD-10-PCS | Mod: CPTII,S$GLB,, | Performed by: NURSE PRACTITIONER

## 2023-10-06 PROCEDURE — 99396 PREV VISIT EST AGE 40-64: CPT | Mod: S$GLB,,, | Performed by: NURSE PRACTITIONER

## 2023-10-06 PROCEDURE — 1159F PR MEDICATION LIST DOCUMENTED IN MEDICAL RECORD: ICD-10-PCS | Mod: CPTII,S$GLB,, | Performed by: NURSE PRACTITIONER

## 2023-10-06 PROCEDURE — 84146 ASSAY OF PROLACTIN: CPT | Performed by: NURSE PRACTITIONER

## 2023-10-06 PROCEDURE — 83036 HEMOGLOBIN GLYCOSYLATED A1C: CPT | Performed by: NURSE PRACTITIONER

## 2023-10-06 PROCEDURE — 80061 LIPID PANEL: CPT | Performed by: NURSE PRACTITIONER

## 2023-10-06 PROCEDURE — 83001 ASSAY OF GONADOTROPIN (FSH): CPT | Performed by: NURSE PRACTITIONER

## 2023-10-06 PROCEDURE — 99396 PR PREVENTIVE VISIT,EST,40-64: ICD-10-PCS | Mod: S$GLB,,, | Performed by: NURSE PRACTITIONER

## 2023-10-06 PROCEDURE — 3044F PR MOST RECENT HEMOGLOBIN A1C LEVEL <7.0%: ICD-10-PCS | Mod: CPTII,S$GLB,, | Performed by: NURSE PRACTITIONER

## 2023-10-06 PROCEDURE — 99386 PR PREVENTIVE VISIT,NEW,40-64: ICD-10-PCS | Mod: S$GLB,,, | Performed by: NURSE PRACTITIONER

## 2023-10-06 PROCEDURE — 86803 HEPATITIS C AB TEST: CPT | Performed by: NURSE PRACTITIONER

## 2023-10-06 NOTE — PROGRESS NOTES
Subjective:       Patient ID: Nakita Vaughn is a 42 y.o. female.    Chief Complaint:  Well Woman      History of Present Illness  Annual Exam-Premenopausal   presents for annual exam.  The patient is sexually active with her ; denies any complaints. GYN screening history: last pap: approximate date 2019 and was normal and last mammogram: approximate date 2023 and was normal. The patient wears seatbelts: yes. The patient participates in regular exercise: no. Has the patient ever been transfused or tattooed?: yes. X1 tattoo.  The patient reports that there is not domestic violence in her life.    Irregular cycles since menarche; has never been on birth control.  Sometimes it will be monthly for 4-5 months then can skip 1-2 months.  Cramping really bad today.  Horrible headache when menstruating; losing hair and hirsutism is out of control in the last year.  RLQ pain that is excruciating.  Feels like a ripping pain; a few times a week.  Has been going on for over a year.  Sometimes will stop her in her tracks.      Constipation and sometimes diarrhea.  Unsure if she has IBS.  No bladder issues.     GYN & OB History  Patient's last menstrual period was 2023 (approximate).   Date of Last Pap: No result found    OB History    Para Term  AB Living   3 3 3     3   SAB IAB Ectopic Multiple Live Births                  # Outcome Date GA Lbr Papo/2nd Weight Sex Delivery Anes PTL Lv   3 Term            2 Term            1 Term                Review of Systems  Review of Systems   Constitutional:  Negative for activity change, appetite change, chills, fatigue and fever.   HENT:  Negative for nasal congestion and mouth sores.    Respiratory:  Negative for cough, shortness of breath and wheezing.    Cardiovascular:  Negative for chest pain.   Gastrointestinal:  Positive for abdominal pain, constipation and diarrhea. Negative for nausea and vomiting.   Endocrine: Negative for hair loss  and hot flashes.   Genitourinary:  Positive for dysmenorrhea, flank pain, frequency, menorrhagia, menstrual problem, pelvic pain and urgency. Negative for bladder incontinence, decreased libido, dyspareunia, dysuria, genital sores, hematuria, hot flashes, vaginal discharge, vaginal pain, urinary incontinence, postcoital bleeding, postmenopausal bleeding, vaginal dryness and vaginal odor.   Musculoskeletal:  Positive for back pain.   Integumentary:  Positive for hair changes. Negative for rash, breast mass, nipple discharge, breast skin changes and breast tenderness.   Neurological:  Positive for headaches.   Hematological:  Negative for adenopathy.   Psychiatric/Behavioral:  Negative for depression and sleep disturbance. The patient is not nervous/anxious.    All other systems reviewed and are negative.  Breast: Negative for breast self exam, lump, mass, mastodynia, nipple discharge, skin changes and tenderness          Objective:      Physical Exam:   Constitutional: She is oriented to person, place, and time. She appears well-developed and well-nourished. No distress.    HENT:   Head: Normocephalic and atraumatic.   Nose: Nose normal.    Eyes: Pupils are equal, round, and reactive to light. Conjunctivae and EOM are normal. Right eye exhibits no discharge. Left eye exhibits no discharge.     Cardiovascular:  Normal rate, regular rhythm and normal heart sounds.      Exam reveals no gallop, no friction rub, no clubbing, no cyanosis and no edema.       No murmur heard.   Pulmonary/Chest: Effort normal and breath sounds normal. No respiratory distress. She has no decreased breath sounds. She has no wheezes. She has no rhonchi. She has no rales. She exhibits no tenderness. Right breast exhibits no inverted nipple, no mass, no nipple discharge, no skin change, no tenderness, no bleeding and no swelling. Left breast exhibits no inverted nipple, no mass, no nipple discharge, no skin change, no tenderness, no bleeding and  no swelling. Breasts are symmetrical.        Abdominal: Soft. Bowel sounds are normal. She exhibits no distension. There is no abdominal tenderness. There is no rebound and no guarding. Hernia confirmed negative in the right inguinal area and confirmed negative in the left inguinal area.     Genitourinary:    Inguinal canal, vagina, uterus, right adnexa and left adnexa normal.   Rectum:      No external hemorrhoid.      Pelvic exam was performed with patient in the lithotomy position.   The external female genitalia was normal.   No external genitalia lesions identified,Genitalia hair distrobution normal .   Labial bartholins normal.There is no rash, tenderness, lesion or injury on the right labia. There is no rash, tenderness, lesion or injury on the left labia. Cervix is normal. Right adnexum displays no mass, no tenderness and no fullness. Left adnexum displays no mass, no tenderness and no fullness. No erythema,  no vaginal discharge, tenderness or bleeding in the vagina.    No foreign body in the vagina.      No signs of injury in the vagina.   Vagina was moist.Cervix exhibits no motion tenderness, no lesion, no discharge, no friability, no lesion, no tenderness and no polyp.    pap smear completedUerus contour normal  Uterus is not enlarged and not tender. Uterus size: 10 cm.Normal urethral meatus.Urethral Meatus exhibits: urethral lesion and prolapsedUrethra findings: no urethral mass, no tenderness and no urethral scarringBladder findings: no bladder distention and no bladder tenderness          Musculoskeletal: Normal range of motion and moves all extremeties.      Lymphadenopathy: No inguinal adenopathy noted on the right or left side.    Neurological: She is alert and oriented to person, place, and time.    Skin: Skin is warm and dry. No rash noted. She is not diaphoretic. No cyanosis or erythema. No pallor. Nails show no clubbing.    Psychiatric: She has a normal mood and affect. Her speech is normal and  behavior is normal. Judgment and thought content normal.             Assessment:        1. Encounter for gynecological examination without abnormal finding    2. RLQ abdominal pain    3. Encounter for screening for cervical cancer    4. Encounter for screening mammogram for malignant neoplasm of breast    5. Irregular menstrual cycle    6. Hirsutism               Plan:   Labs and u/s ordered    mammo ordered, continue yearly until age 75    Reviewed updated recommendations for pap smears (every 3 years) in low risk patients.  Recommend annual pelvic exams.  Reviewed recommendations for annual CBE.  Next pap due in 2026.   RTC 1 year or sooner prn.  To PCP for other health maintenance.  mammo ordered, continue yearly until age 75      Encounter for gynecological examination without abnormal finding  -     Liquid-Based Pap Smear, Screening  -     HPV High Risk Genotypes, PCR    RLQ abdominal pain  -     US Pelvis Comp with Transvag NON-OB (xpd; Future; Expected date: 10/06/2023    Encounter for screening for cervical cancer  -     Liquid-Based Pap Smear, Screening  -     HPV High Risk Genotypes, PCR    Encounter for screening mammogram for malignant neoplasm of breast  -     Mammo Digital Screening Bilat w/ Chin; Future; Expected date: 10/06/2023    Irregular menstrual cycle  -     Follicle Stimulating Hormone; Future; Expected date: 10/06/2023  -     Luteinizing Hormone; Future; Expected date: 10/13/2023  -     Testosterone; Future; Expected date: 10/06/2023  -     DHEA-Sulfate; Future; Expected date: 10/06/2023  -     Prolactin; Future; Expected date: 10/06/2023    Hirsutism  -     Follicle Stimulating Hormone; Future; Expected date: 10/06/2023  -     Luteinizing Hormone; Future; Expected date: 10/13/2023  -     Testosterone; Future; Expected date: 10/06/2023  -     DHEA-Sulfate; Future; Expected date: 10/06/2023  -     Prolactin; Future; Expected date: 10/06/2023

## 2023-10-06 NOTE — PROGRESS NOTES
"Subjective:       Patient ID: Nakita Vaughn is a 42 y.o. female.    Chief Complaint: Annual Exam    42 year old here for annual  Doing well  Saw pulm for shortness of breath in 2021; was having asthma work up; breo helped a lot but ran out; having to use rescue inhaler when she goes up stairs; due for pulm follow up  Gets headaches before cycles  Cycles are monthly; tubes are tied  Bowels are inconsistent; constipation before cycle and diarrhea with cycle  Mood is ok; states she is vincent; snaps at   Sleep is not great - falls asleep ok but wakes up in the middle of the night      /82 (BP Location: Right arm)   Pulse 100   Temp 98.1 °F (36.7 °C)   Ht 5' 3" (1.6 m)   Wt 87.7 kg (193 lb 5.5 oz)   LMP 10/06/2023 (Approximate)   BMI 34.25 kg/m²     Review of Systems   Constitutional:  Negative for activity change and unexpected weight change.   HENT:  Negative for hearing loss, rhinorrhea and trouble swallowing.    Eyes:  Negative for discharge and visual disturbance.   Respiratory:  Positive for chest tightness and wheezing.    Cardiovascular:  Positive for chest pain and palpitations.   Gastrointestinal:  Positive for constipation and diarrhea. Negative for blood in stool and vomiting.   Endocrine: Positive for polyuria. Negative for polydipsia.   Genitourinary:  Positive for difficulty urinating and menstrual problem. Negative for dysuria and hematuria.   Musculoskeletal:  Positive for arthralgias and joint swelling. Negative for neck pain.   Neurological:  Positive for headaches. Negative for weakness.   Psychiatric/Behavioral:  Negative for confusion and dysphoric mood.        Objective:      Physical Exam  Vitals and nursing note reviewed.   Constitutional:       General: She is not in acute distress.     Appearance: Normal appearance. She is well-developed. She is not diaphoretic.   HENT:      Head: Normocephalic and atraumatic.      Right Ear: Hearing, tympanic membrane, ear canal and " external ear normal.      Left Ear: Hearing, tympanic membrane, ear canal and external ear normal.      Nose: Nose normal. No mucosal edema or rhinorrhea.      Right Sinus: No maxillary sinus tenderness or frontal sinus tenderness.      Left Sinus: No maxillary sinus tenderness or frontal sinus tenderness.      Mouth/Throat:      Pharynx: Uvula midline. No oropharyngeal exudate or posterior oropharyngeal erythema.   Eyes:      General:         Right eye: No discharge.         Left eye: No discharge.      Conjunctiva/sclera: Conjunctivae normal.      Pupils: Pupils are equal, round, and reactive to light.   Neck:      Thyroid: No thyroid mass or thyromegaly.      Vascular: No JVD.      Trachea: Trachea normal. No tracheal deviation.   Cardiovascular:      Rate and Rhythm: Normal rate and regular rhythm.      Heart sounds: Normal heart sounds. No murmur heard.  Pulmonary:      Effort: Pulmonary effort is normal. No respiratory distress.      Breath sounds: Normal breath sounds. No stridor. No decreased breath sounds, wheezing, rhonchi or rales.   Chest:      Chest wall: No tenderness.   Abdominal:      General: Bowel sounds are normal. There is no distension.      Palpations: Abdomen is soft. Abdomen is not rigid. There is no fluid wave.      Tenderness: There is no abdominal tenderness. There is no guarding or rebound.   Musculoskeletal:         General: Normal range of motion.      Cervical back: Normal range of motion.   Lymphadenopathy:      Head:      Right side of head: No tonsillar adenopathy.      Left side of head: No tonsillar adenopathy.      Cervical: No cervical adenopathy.   Skin:     General: Skin is warm and dry.      Findings: No rash.   Neurological:      Mental Status: She is alert and oriented to person, place, and time.   Psychiatric:         Speech: Speech normal.         Behavior: Behavior normal. Behavior is cooperative.         Thought Content: Thought content normal.         Judgment:  Judgment normal.       Assessment:       1. Annual physical exam        Plan:       Nakita was seen today for annual exam.    Diagnoses and all orders for this visit:    Annual physical exam  -     CBC Auto Differential; Future  -     Comprehensive Metabolic Panel; Future  -     Lipid Panel; Future  -     TSH; Future  -     Hemoglobin A1C; Future      Discussed age appropriate anticipatory guidance, healthy diet and lifestyle, regular exercise, weight management.  Declines flu shot  Seeing gyn this am to update pap  Add natural calm at night for sleep/mood/menstrual migraines/anxiety  Follow up with Dr. Rueda annually and prn

## 2023-10-13 ENCOUNTER — TELEPHONE (OUTPATIENT)
Dept: OBSTETRICS AND GYNECOLOGY | Facility: CLINIC | Age: 43
End: 2023-10-13
Payer: COMMERCIAL

## 2023-10-13 NOTE — TELEPHONE ENCOUNTER
Attempted to contact pt no answer, lvm for pt to give office a callback at [phone]. Message sent

## 2024-03-28 ENCOUNTER — HOSPITAL ENCOUNTER (OUTPATIENT)
Dept: RADIOLOGY | Facility: HOSPITAL | Age: 44
Discharge: HOME | End: 2024-03-28
Attending: NURSE PRACTITIONER
Payer: COMMERCIAL

## 2024-03-28 DIAGNOSIS — Z12.31 ENCOUNTER FOR SCREENING MAMMOGRAM FOR MALIGNANT NEOPLASM OF BREAST: ICD-10-CM

## 2024-03-28 PROCEDURE — 77063 BREAST TOMOSYNTHESIS BI: CPT | Mod: TC,PO

## 2024-03-28 PROCEDURE — 77063 BREAST TOMOSYNTHESIS BI: CPT | Mod: 26,,, | Performed by: RADIOLOGY

## 2024-03-28 PROCEDURE — 77067 SCR MAMMO BI INCL CAD: CPT | Mod: 26,,, | Performed by: RADIOLOGY

## 2024-04-01 ENCOUNTER — TELEPHONE (OUTPATIENT)
Dept: RADIOLOGY | Facility: HOSPITAL | Age: 44
End: 2024-04-01
Payer: COMMERCIAL

## 2024-04-02 ENCOUNTER — TELEPHONE (OUTPATIENT)
Dept: RADIOLOGY | Facility: HOSPITAL | Age: 44
End: 2024-04-02
Payer: COMMERCIAL

## 2024-04-02 ENCOUNTER — TELEPHONE (OUTPATIENT)
Dept: OBSTETRICS AND GYNECOLOGY | Facility: CLINIC | Age: 44
End: 2024-04-02
Payer: COMMERCIAL

## 2024-04-02 ENCOUNTER — HOSPITAL ENCOUNTER (OUTPATIENT)
Dept: RADIOLOGY | Facility: HOSPITAL | Age: 44
Discharge: HOME | End: 2024-04-02
Attending: NURSE PRACTITIONER
Payer: COMMERCIAL

## 2024-04-02 DIAGNOSIS — R92.8 ABNORMAL MAMMOGRAM: Primary | ICD-10-CM

## 2024-04-02 DIAGNOSIS — R92.8 ABNORMAL MAMMOGRAM: ICD-10-CM

## 2024-04-02 PROCEDURE — 77061 BREAST TOMOSYNTHESIS UNI: CPT | Mod: TC,RT

## 2024-04-02 PROCEDURE — 77065 DX MAMMO INCL CAD UNI: CPT | Mod: 26,RT,, | Performed by: RADIOLOGY

## 2024-04-02 PROCEDURE — 76642 ULTRASOUND BREAST LIMITED: CPT | Mod: TC,RT

## 2024-04-02 PROCEDURE — 77061 BREAST TOMOSYNTHESIS UNI: CPT | Mod: 26,RT,, | Performed by: RADIOLOGY

## 2024-04-02 PROCEDURE — 77065 DX MAMMO INCL CAD UNI: CPT | Mod: TC,RT

## 2024-04-02 PROCEDURE — 76642 ULTRASOUND BREAST LIMITED: CPT | Mod: 26,RT,, | Performed by: RADIOLOGY

## 2024-04-02 NOTE — TELEPHONE ENCOUNTER
Ultrasound guided biopsy scheduled for 4/9/24 at 9:30, arrival time 9am. Biopsy instructions given with understandings verbalized. Patient has my contact information.

## 2024-04-02 NOTE — TELEPHONE ENCOUNTER
Returned pt's phone call; pt verified name and ; pt reports she has spoken with radiology. Has questions about the results.  Explained to her in detail and the suspicion of breast cancer.  Biopsy has been scheduled; pt denies any further questions at this time. Will contact her with results.  CHARLEY Richter-BC

## 2024-04-09 ENCOUNTER — HOSPITAL ENCOUNTER (OUTPATIENT)
Dept: RADIOLOGY | Facility: HOSPITAL | Age: 44
Discharge: HOME | End: 2024-04-09
Attending: NURSE PRACTITIONER
Payer: COMMERCIAL

## 2024-04-09 DIAGNOSIS — R92.8 ABNORMAL MAMMOGRAM: ICD-10-CM

## 2024-04-09 PROCEDURE — 88305 TISSUE EXAM BY PATHOLOGIST: CPT | Mod: 26,,, | Performed by: PATHOLOGY

## 2024-04-09 PROCEDURE — 88305 TISSUE EXAM BY PATHOLOGIST: CPT | Performed by: PATHOLOGY

## 2024-04-09 PROCEDURE — 19083 BX BREAST 1ST LESION US IMAG: CPT | Mod: RT,,, | Performed by: RADIOLOGY

## 2024-04-09 PROCEDURE — 77065 DX MAMMO INCL CAD UNI: CPT | Mod: 26,RT,, | Performed by: RADIOLOGY

## 2024-04-09 PROCEDURE — 19083 BX BREAST 1ST LESION US IMAG: CPT | Mod: RT

## 2024-04-09 PROCEDURE — 88342 IMHCHEM/IMCYTCHM 1ST ANTB: CPT | Mod: 59 | Performed by: PATHOLOGY

## 2024-04-09 PROCEDURE — 88341 IMHCHEM/IMCYTCHM EA ADD ANTB: CPT | Mod: 59 | Performed by: PATHOLOGY

## 2024-04-09 PROCEDURE — 88341 IMHCHEM/IMCYTCHM EA ADD ANTB: CPT | Mod: 26,59,, | Performed by: PATHOLOGY

## 2024-04-09 PROCEDURE — 88342 IMHCHEM/IMCYTCHM 1ST ANTB: CPT | Mod: 26,59,, | Performed by: PATHOLOGY

## 2024-04-09 PROCEDURE — 77065 DX MAMMO INCL CAD UNI: CPT | Mod: TC,RT

## 2024-04-09 PROCEDURE — 88360 TUMOR IMMUNOHISTOCHEM/MANUAL: CPT | Mod: 26,,, | Performed by: PATHOLOGY

## 2024-04-09 PROCEDURE — 88360 TUMOR IMMUNOHISTOCHEM/MANUAL: CPT | Performed by: PATHOLOGY

## 2024-04-12 ENCOUNTER — TELEPHONE (OUTPATIENT)
Dept: OBSTETRICS AND GYNECOLOGY | Facility: CLINIC | Age: 44
End: 2024-04-12
Payer: COMMERCIAL

## 2024-04-12 ENCOUNTER — TELEPHONE (OUTPATIENT)
Dept: SURGERY | Facility: CLINIC | Age: 44
End: 2024-04-12
Payer: COMMERCIAL

## 2024-04-12 DIAGNOSIS — Z17.0 MALIGNANT NEOPLASM OF OVERLAPPING SITES OF RIGHT BREAST IN FEMALE, ESTROGEN RECEPTOR POSITIVE: Primary | ICD-10-CM

## 2024-04-12 DIAGNOSIS — C50.811 MALIGNANT NEOPLASM OF OVERLAPPING SITES OF RIGHT BREAST IN FEMALE, ESTROGEN RECEPTOR POSITIVE: Primary | ICD-10-CM

## 2024-04-12 LAB
FINAL PATHOLOGIC DIAGNOSIS: NORMAL
GROSS: NORMAL
Lab: NORMAL
MICROSCOPIC EXAM: NORMAL

## 2024-04-12 NOTE — TELEPHONE ENCOUNTER
Phoned pt about breast biopsy results. Pt verified name and .  Reports she already spoke with the nurse from the breast department and is scheduled for Monday with the doctor.  Did not currently have any questions.  Encouraged her to make a list for visit.  Pt verbalized understanding. CHARLEY Richter-BC

## 2024-04-12 NOTE — TELEPHONE ENCOUNTER
Called patient to discuss breast biopsy results- we reviewed the pathology report and that the next step was to meet with a breast surgical oncologist to discuss removing this area and the treatment plan. Patient scheduled with Dr. Metzger and Dr. Okeefe on 4/18/24. Patient educated on expectations for visit and encouraged to bring support person(s). All questions answered and pt denied any other needs at this time.

## 2024-04-14 PROBLEM — C50.311 MALIGNANT NEOPLASM OF LOWER-INNER QUADRANT OF RIGHT BREAST OF FEMALE, ESTROGEN RECEPTOR POSITIVE: Status: ACTIVE | Noted: 2024-04-14

## 2024-04-14 PROBLEM — Z17.0 MALIGNANT NEOPLASM OF LOWER-INNER QUADRANT OF RIGHT BREAST OF FEMALE, ESTROGEN RECEPTOR POSITIVE: Status: ACTIVE | Noted: 2024-04-14

## 2024-04-15 ENCOUNTER — OFFICE VISIT (OUTPATIENT)
Dept: SURGERY | Facility: CLINIC | Age: 44
End: 2024-04-15
Payer: COMMERCIAL

## 2024-04-15 DIAGNOSIS — C50.311 MALIGNANT NEOPLASM OF LOWER-INNER QUADRANT OF RIGHT BREAST OF FEMALE, ESTROGEN RECEPTOR POSITIVE: Primary | ICD-10-CM

## 2024-04-15 DIAGNOSIS — Z17.0 MALIGNANT NEOPLASM OF LOWER-INNER QUADRANT OF RIGHT BREAST OF FEMALE, ESTROGEN RECEPTOR POSITIVE: Primary | ICD-10-CM

## 2024-04-15 PROCEDURE — 99205 OFFICE O/P NEW HI 60 MIN: CPT | Mod: S$GLB,,, | Performed by: SURGERY

## 2024-04-15 NOTE — PROGRESS NOTES
Breast Surgical Oncology  [place]    Date of Service: 2024    SUBJECTIVE:   Chief complaint: right breast cancer    HISTORY OF PRESENT ILLNESS:   Nakita Vaughn is a 43 y.o. female who is kindly referred by Chencho Diaz NP for right breast cancer.    A right breast abnormality was identified on routine screening mammography.  Focused sonographic evaluation revealed a 7 mm x 6 mm x 7 mm irregularly shaped, non-parallel mass with angular margins seen in the inner region of the right breast at 4 o'clock radian, 6 cm from the nipple. No axillary adenopathy. Core needle biopsy confirmed hormone receptor positive, ldd8rge negative invasive lobular carcinoma. She denies breast concerns such as pain, masses, skin changes, nipple discharge, nipple retraction or lumps under the arm.  She denies prior breast surgery or biopsy.     Her breast cancer risk factor profile is as follows: Menarche at 11, Menopause at N/A.  She is . Age at first live birth was 19. Family history of cancer is as follows: maternal grandmother with ovarian cancer at age 63,  at age 65.     FAMILY HISTORY:     Family History   Problem Relation Name Age of Onset    Diabetes Mother      Hypertension Mother      COPD Mother      Ovarian cancer Maternal Grandmother  63    Breast cancer Neg Hx      Colon cancer Neg Hx          PAST MEDICAL HISTORY:     Past Medical History:   Diagnosis Date    Shortness of breath 2021    Snoring 2021       SURGICAL HISTORY:     Past Surgical History:   Procedure Laterality Date     SECTION      x2     SECTION WITH TUBAL LIGATION         SOCIAL HISTORY:     Social History     Tobacco Use    Smoking status: Never    Smokeless tobacco: Never   Substance Use Topics    Alcohol use: No    Drug use: No        MEDICATIONS/ALLERGIES:     Current Outpatient Medications:     albuterol (PROVENTIL/VENTOLIN HFA) 90 mcg/actuation inhaler, Inhale 2 puffs into the lungs every 6 (six)  hours as needed for Wheezing. Rescue, Disp: 18 g, Rfl: 0  Review of patient's allergies indicates:  No Known Allergies    REVIEW OF SYSTEMS:   I have reviewed 12 systems, including 2 points per system. Pertinent reported positives are: none    PHYSICAL EXAM:   General: The patient appears well and is in no acute distress.     Shelli So RN was present as a chaperone for the examination.   BREAST EXAM  No Asymmetry  Right:  - Mass: No  - Skin change: post biopsy ecchymosis LIQ  - Nipple Discharge: No  - Nipple retraction: No  - Axillary LAD: No  Left:   - Mass: No  - Skin change: No  - Nipple Discharge: No  - Nipple retraction: No  - Axillary LAD: No    IMAGING:   Images were personally reviewed.     Results for orders placed during the hospital encounter of 04/02/24    Mammo Digital Diagnostic Right with Chin    Narrative  Result:  Mammo Digital Diagnostic Right with Chin  US Breast Right Limited    History:  Patient is 43 y.o. and is seen for diagnostic imaging.    Films Compared:  Compared to: 03/28/2024 Mammo Digital Screening Bilat w/ Chin, 01/11/2023 Mammo Digital Screening Bilat w/ Chin, and 05/07/2021 Mammo Digital Screening Bilat w/ Chin    Findings:  This procedure was performed using tomosynthesis. Computer-aided detection was utilized in the interpretation of this examination.  Mammo Digital Diagnostic Right with Chin  The right breast has scattered areas of fibroglandular density.  There is an asymmetry seen in the inner region of the right breast in the posterior depth.    US Breast Right Limited  There is a 7 mm x 6 mm x 7 mm irregularly shaped, non-parallel mass with angular margins seen in the inner region of the right breast at 4 o'clock in the posterior depth. No axillary adenopathy.    Impression  Right  Asymmetry: Right breast asymmetry at the inner posterior position. Assessment: 4C - Suspicious finding. Ultrasound-guided biopsy is recommended.    BI-RADS Category:  Overall: 4 -  Suspicious      Recommendation:  Ultrasound-guided biopsy is recommended.      Your estimated lifetime risk of breast cancer (to age 85) based on Tyrer-Cuzick risk assessment model is Tyrer-Cuzick: 5.85 %. According to the American Cancer Society, patients with a lifetime breast cancer risk of 20% or higher might benefit from supplemental screening tests.      PATHOLOGY:     Lab Results   Component Value Date    FPATHDX  04/09/2024     Right breast 4:00, biopsy:  Invasive lobular carcinoma, Grade 2 (tubules = 3, nuclei = 2, mitoses = 1), measuring 6 mm (0.6 cm) in greatest linear dimension within the core biopsies  Lobular carcinoma in-situ (LCIS), classic type    Breast molecular markers:  ER:  Positive (%, strong)  AK:  Positive (%, strong)  HER2 IHC:  Negative (score 0)   Ki-67:  15%       ASSESSMENT:     1. Malignant neoplasm of lower-inner quadrant of right breast of female, estrogen receptor positive          PLAN:     Nakita Vaughn is a 43 y.o. female who is new diagnosis of Stage IA (T1b N0 Mx) RIGHT Breast Invasive Lobular Carcinoma, Grade 2, ER %, AK %, Ixq7jtr 0 with a ki67 of 15%. I have reviewed her imaging and pathology reports with her and I have provided her with copies. Today, we reviewed reviewed the guidelines of the National Comprehensive Cancer Network for her diagnosis.    I am ordering a breast MRI to exclude other lesions in either breast.  I am referring her for genetic counseling based on her age and diagnosis.      We have discussed the surgical choices of lumpectomy with radiation and mastectomy with or without radiation.  I have explained that the survival is the same, regardless of the surgery chosen.  We have discussed that the local recurrence rate following mastectomy is 2-5%.  I have explained that the local recurrence rate was slightly higher following breast conservation in the large trials that compared mastectomy and breast conservation. However, with  current medical therapies, local recurrence has been reduced to as low as 6%. I did review with her the general schedule and side effects of radiation. She will be referred to radiation oncology. We briefly discussed reconstruction options, and the [facility] breast cancer treatment brochure was provided.    We reviewed the need for sentinel lymph node biopsy to further stage the axilla.       Because her cancer is hormone receptor positive, she will be recommended for endocrine therapy.  The decision for or against adjuvant chemotherapy will be made following surgery. She understands that she will be referred to a medical oncologist to discuss these points further.    I have provided her with general information regarding the supportive services available here including oncology social work, patient navigation, nutritional services, preoperative and postoperative physical therapy programs, and genetic counseling.      I spent a total of 60 minutes on this visit. This includes face to face time and non-face to face time preparing to see the patient (eg, review of tests), obtaining and/or reviewing separately obtained history, documenting clinical information in the electronic or other health record, independently interpreting results and communicating results to the patient/family/caregiver, or care coordinator.        Negrita Metzger M.D.

## 2024-04-15 NOTE — PROGRESS NOTES
Genetic Note    Pt is referred by Negrita Metzger MD for the genetic counseling and testing    A right breast abnormality was identified on routine screening mammography.  Focused sonographic evaluation revealed a 7 mm x 6 mm x 7 mm irregularly shaped, non-parallel mass with angular margins seen in the inner region of the right breast at 4 o'clock radian, 6 cm from the nipple. No axillary adenopathy. Core needle biopsy confirmed hormone receptor positive, [id number] negative invasive lobular carcinoma. Stage IA (T1b N0 Mx) RIGHT Breast Invasive Lobular Carcinoma, Grade 2, ER %, IN %, [id number] 0 with a ki67 of 15%. .      Her breast cancer risk factor profile is as follows: Menarche at 11, Menopause at N/A.  She is . Age at first live birth was 19. Family history of cancer is as follows: maternal grandmother with ovarian cancer at age 63,  at age 65.     Germline cancer-genetic testing is the testing of genes associated with cancer, known as cancer susceptibility genes.  Just as these genes are inherited from parents, mutations in these genes can be inherited, as well.  A mutation in a cancer susceptibility gene adversely affects the gene's ability to prevent cancer; therefore, carriers of cancer susceptibility gene mutations may be at increased risk for certain cancers.     A small percentage of cancers are caused by an cancer susceptibility gene mutation, meaning the cancer is genetic/hereditary; rather, most cancers are sporadic.  Causes of sporadic cancers may include environmental risk factors, lifestyle risk factors, and non-modifiable risk factors.  It is important to note that members of a family often share not only their genetics but also risk factors including environmental and lifestyle risk factors.     Results of genetic testing include positive, negative, and variant of unknown significance (VUS).  A positive result indicates the presence of at least one clinically significant  mutation, and the patient's specific cancer risks vary depending upon the tumor-suppressor gene(s) in which there is/are a mutation(s).  With a positive result, in some cases, depending upon the specific result and the patient's clinical history, modified management may be recommended, including measures for risk reduction and/or surveillance; however, modified management is not always an option.  A negative result indicates that no clinically significant mutations were identified in the gene(s) tested.  A VUS indicates that there is not presently enough data for the laboratory to make a determination as to whether the variant is clinically significant; VUSs are not typically acted upon clinically.       The ability to interpret the meaning of a negative genetic testing result in genes associated with cancer with which the patient has not personally been affected, when done prior to testing the appropriate affected relative(s), is significantly limited.  A negative result in the patient does not indicate that she cannot develop cancer, and, in fact, the patient may even be at increased risk for cancer based on shared risk factors with affected relatives.  The most informative candidates for initial genetic testing in a family are those who have been affected with cancer.     Modified management may also be recommended, even with a result of no or unknown significance, based upon risk assessment that incorporates the family history.       Sometimes, depending upon the genetic testing result and the cancer diagnosis, additional/modified treatments may be an option, though this is not guaranteed.     If pt tests positive for a mutation, her first-degree relatives would each have a 50% chance of having the same mutation, and other, more distantly related blood-relatives would also be at risk of having the same mutation.       The Genetic Information Nondiscrimination Act (CLARITA) is U[place] federal legislation that provides  some protections against use of an individual's genetic information by their health insurer and by their employer.  Title I of CLARITA prohibits most health insurers from utilizing an individual's genetic information to make decisions regarding insurance eligibility, coverage, underwriting, or premium charges.  Title II of CLARITA prohibits covered entities, which include employers, employment agencies, labor organizations, and joint labor-management training and apprenticeship programs, from requesting, requiring, or disclosing the genetic information of employees and applicants.  CLARITA also prohibits health insurers and employers from asking or mandating that an individual take a genetic test.  CLARITA does not protect individuals from genetic discrimination toward health insurance obtained through a job with the  or through the Federal Employees Health Benefits Plan; from genetic discrimination by employers with fewer than 15 employees; or from genetic discrimination by any other type of policies/entities, including but not limited to life insurance, disability insurance, long-term care insurance,  benefits, and  Health Services benefits.     An outside laboratory would perform the testing after a blood sample is collected.  With genetic testing, there is a potential for the patient to incur out-of-pocket costs.  Results can take several weeks.  Post-test genetic counseling can be conducted once the genetic testing results are available.     Questions were encouraged and answered to the patient's satisfaction, and she verbalized understanding of information and agreement with the plan.        ASSESSMENT/PLAN      A cancer-genetic evaluation and pre-test genetic counseling were conducted. Based on the information provided by pt and family, her personal and/or family history is suggestive of a potential potential hereditary predisposition to cancer. The recommendation is to proceed with germline testing  to include the genes commonly associated with hereditary breast cancer, including BRCA1 and BRCA2.  Pt was given the option of proceeding with testing now, deferring testing to a later date, or declining testing and opted to proceed.     Genetic test: [facility] STAT breast cancer and 70 gene multicancer panel   Collection: By [facility] on today..    Consent: The patient is to provide her written informed consent.    Results are expected approximately 2-3 weeks after the genetic testing laboratory receives the specimen.       Encounter for non-procreative genetic counseling  Family history of ovarian cancer  Personal history of breast cancer  Proceed with germline genetic testing.  Follow up with results.     REFERENCES      National Comprehensive Cancer Network (NCCN). (2021). Genetic/familial high-risk assessment: Breast, ovarian, and pancreatic. NCCN Clinical Practice Guidelines in Oncology (NCCN Guidelines), Version 1.2022.  National Comprehensive Cancer Network (NCCN). (2021). Genetic/familial high-risk assessment: Colorectal. NCCN Clinical Practice Guidelines in Oncology (NCCN Guidelines), Version 1.2021.  National Comprehensive Cancer Network (NCCN). (2021). Prostate cancer. NCCN Clinical Practice Guidelines in Oncology (NCCN Guidelines), Version 1.2022.    60 min spent reviewing pt record, completing application forms for testing, explaining risk vs benefits of testing and signing consent and answering all questions related to her recent diagnosis and planned treatment

## 2024-04-16 ENCOUNTER — PATIENT MESSAGE (OUTPATIENT)
Dept: SURGERY | Facility: CLINIC | Age: 44
End: 2024-04-16
Payer: COMMERCIAL

## 2024-04-16 DIAGNOSIS — C50.311 MALIGNANT NEOPLASM OF LOWER-INNER QUADRANT OF RIGHT BREAST OF FEMALE, ESTROGEN RECEPTOR POSITIVE: Primary | ICD-10-CM

## 2024-04-16 DIAGNOSIS — Z17.0 MALIGNANT NEOPLASM OF LOWER-INNER QUADRANT OF RIGHT BREAST OF FEMALE, ESTROGEN RECEPTOR POSITIVE: Primary | ICD-10-CM

## 2024-04-16 NOTE — NURSING
Nurse Navigator Note:     Met with patient and spouse during her consult with Dr. Metzger.  Patient and I reviewed the information she discussed with Dr. Metzger, including treatment options, referrals, diagnosis, and future plans for workup. Patient and I went through the new patient booklet, I explained some of the information and why it is provided.   Specifically discussed shared services listed in booklet and how to request referrals. Discussed the role of the nurse navigator and how I can help her through her breast cancer journey.     Patient was given a copy of her appointments, Dr. Metzger's card, and my card. Encouraged her to call me if she has any questions or concerns or would like to schedule any additional appointments. Patient verbalized understanding of all information.      Pt scheduled to meet with Dr. Okeefe 4/17, Destiny Benavides for genetic counseling 4/18 and a breast MRI 4/19. Pt is interested in bilateral mastectomies with reconstruction potentially- referral placed for pt to see Dr. Pastrana to discuss reconstruction options, all records needed sent to MD. Referral placed for PT with Shelley Leon at [place] for pre op evale.     Oncology Navigation   Intake  Date of Diagnosis: 04/09/24  Cancer Type: Breast  Type of Referral: Internal  Date of Referral: 04/12/24  Initial Nurse Navigator Contact: 04/12/24  Referral to Initial Contact Timeline (days): 0  Date Worked: 04/15/24  First Appointment Available: 04/18/24  Appointment Date: 04/18/24  First Available Date vs. Scheduled Date (days): 0  Multiple appointments: Yes (Dr. Okeefe)     Treatment  Current Status: Active  Date Presented to Tumor Board: 04/18/24    Surgery: Planned  Surgical Oncologist: Negrita Metzger MD  Plastic Surgeon: Gadiel Pastrana MD  Consult Date: 04/15/24    Medical Oncologist: Yakov Okeefe MD  Consult Date: 04/17/24       Procedures: MRI; Genetic test  Genetic Testing Date Sent: 04/18/24  MRI Schedule Date:  "24    General Referrals: Physical Therapy    ER: Positive  AZ: Positive  Her2: Negative       Support Systems: Spouse/significant other; Family members; Children  Barriers of Care: Barriers to Care "Assessment completed-no barriers noted"     Acuity  Stage: 1  Surgical Procedure Complexity: 2  Treatment Tolerability: Has not started treatment yet/treatment fully completed and side effects resolved  ECO  Comorbidities in Medical History: 1  Hospitalization Within the Past Month: 0   Needed: 0  Support: 0  Verbalizes Financial Concerns: 0  Transportation: 0  Psychological Factors (+1 each): Emotional during conversation  History of noncompliance/frequent no shows and cancellations: 0  Verbalizes the need for more education: 1  Other Factors (+1 for Each): 0  Navigation Acuity: 6     Follow Up  No follow-ups on file.         "

## 2024-04-17 ENCOUNTER — OFFICE VISIT (OUTPATIENT)
Dept: HEMATOLOGY/ONCOLOGY | Facility: CLINIC | Age: 44
End: 2024-04-17
Payer: COMMERCIAL

## 2024-04-17 VITALS
SYSTOLIC BLOOD PRESSURE: 112 MMHG | WEIGHT: 189.25 LBS | HEIGHT: 63 IN | BODY MASS INDEX: 33.53 KG/M2 | TEMPERATURE: 97 F | DIASTOLIC BLOOD PRESSURE: 69 MMHG | OXYGEN SATURATION: 99 % | HEART RATE: 76 BPM

## 2024-04-17 DIAGNOSIS — Z17.0 MALIGNANT NEOPLASM OF OVERLAPPING SITES OF RIGHT BREAST IN FEMALE, ESTROGEN RECEPTOR POSITIVE: ICD-10-CM

## 2024-04-17 DIAGNOSIS — C50.811 MALIGNANT NEOPLASM OF OVERLAPPING SITES OF RIGHT BREAST IN FEMALE, ESTROGEN RECEPTOR POSITIVE: ICD-10-CM

## 2024-04-17 DIAGNOSIS — Z17.0 MALIGNANT NEOPLASM OF LOWER-INNER QUADRANT OF RIGHT BREAST OF FEMALE, ESTROGEN RECEPTOR POSITIVE: Primary | ICD-10-CM

## 2024-04-17 DIAGNOSIS — C50.311 MALIGNANT NEOPLASM OF LOWER-INNER QUADRANT OF RIGHT BREAST OF FEMALE, ESTROGEN RECEPTOR POSITIVE: Primary | ICD-10-CM

## 2024-04-17 PROCEDURE — 3008F BODY MASS INDEX DOCD: CPT | Mod: CPTII,S$GLB,, | Performed by: INTERNAL MEDICINE

## 2024-04-17 PROCEDURE — 1160F RVW MEDS BY RX/DR IN RCRD: CPT | Mod: CPTII,S$GLB,, | Performed by: INTERNAL MEDICINE

## 2024-04-17 PROCEDURE — 3078F DIAST BP <80 MM HG: CPT | Mod: CPTII,S$GLB,, | Performed by: INTERNAL MEDICINE

## 2024-04-17 PROCEDURE — 1159F MED LIST DOCD IN RCRD: CPT | Mod: CPTII,S$GLB,, | Performed by: INTERNAL MEDICINE

## 2024-04-17 PROCEDURE — 3074F SYST BP LT 130 MM HG: CPT | Mod: CPTII,S$GLB,, | Performed by: INTERNAL MEDICINE

## 2024-04-17 PROCEDURE — 99999 PR PBB SHADOW E&M-EST. PATIENT-LVL V: CPT | Mod: PBBFAC,,, | Performed by: INTERNAL MEDICINE

## 2024-04-17 PROCEDURE — 99205 OFFICE O/P NEW HI 60 MIN: CPT | Mod: S$GLB,,, | Performed by: INTERNAL MEDICINE

## 2024-04-17 NOTE — PROGRESS NOTES
"Subjective:       Patient ID: Nakita Vaughn is a 43 y.o. female.    Chief Complaint: Results and Breast Cancer    HPI:  43-year-old female in multidisciplinary conference referred for evaluation of treatment options.  Patient has a 8 mm ERPR positive HER2 negative Ki-67 15% breast cancer.  Patient referred for evaluation and treatment recommendations    Past Medical History:   Diagnosis Date    Malignant neoplasm of lower-inner quadrant of right breast of female, estrogen receptor positive 2024    Shortness of breath 2021    Snoring 2021     Family History   Problem Relation Name Age of Onset    Diabetes Mother      Hypertension Mother      COPD Mother      Ovarian cancer Maternal Grandmother  63    Breast cancer Neg Hx      Colon cancer Neg Hx       Social History     Socioeconomic History    Marital status:    Tobacco Use    Smoking status: Never    Smokeless tobacco: Never   Substance and Sexual Activity    Alcohol use: No    Drug use: No    Sexual activity: Yes     Partners: Male     Birth control/protection: Other-see comments     Comment: Tubal ligation     Past Surgical History:   Procedure Laterality Date     SECTION      x2     SECTION WITH TUBAL LIGATION         Labs:  Lab Results   Component Value Date    WBC 4.56 10/06/2023    HGB 11.8 (L) 10/06/2023    HCT 36.9 (L) 10/06/2023    MCV 85 10/06/2023     10/06/2023     BMP  Lab Results   Component Value Date     10/06/2023    K 4.2 10/06/2023     10/06/2023    CO2 23 10/06/2023    BUN 16 10/06/2023    CREATININE 0.8 10/06/2023    CALCIUM 8.9 10/06/2023    ANIONGAP 6 (L) 10/06/2023    ESTGFRAFRICA >60.0 2021    EGFRNONAA >60.0 2021     Lab Results   Component Value Date    ALT 14 10/06/2023    AST 14 10/06/2023    ALKPHOS 53 (L) 10/06/2023    BILITOT 0.9 10/06/2023       No results found for: "IRON", "TIBC", "FERRITIN", "SATURATEDIRO"  No results found for: "[id number]"  No results " "found for: "FOLATE"  Lab Results   Component Value Date    TSH 1.116 10/06/2023         Review of Systems   Constitutional:  Negative for activity change, appetite change, chills, diaphoresis, fatigue, fever and unexpected weight change.   HENT:  Negative for congestion, dental problem, drooling, ear discharge, ear pain, facial swelling, hearing loss, mouth sores, nosebleeds, postnasal drip, rhinorrhea, sinus pressure, sneezing, sore throat, tinnitus, trouble swallowing and voice change.    Eyes:  Negative for photophobia, pain, discharge, redness, itching and visual disturbance.   Respiratory:  Negative for cough, choking, chest tightness, shortness of breath, wheezing and stridor.    Cardiovascular:  Negative for chest pain, palpitations and leg swelling.   Gastrointestinal:  Negative for abdominal distention, abdominal pain, anal bleeding, blood in stool, constipation, diarrhea, nausea, rectal pain and vomiting.   Endocrine: Negative for cold intolerance, heat intolerance, polydipsia, polyphagia and polyuria.   Genitourinary:  Negative for decreased urine volume, difficulty urinating, dyspareunia, dysuria, enuresis, flank pain, frequency, genital sores, hematuria, menstrual problem, pelvic pain, urgency, vaginal bleeding, vaginal discharge and vaginal pain.   Musculoskeletal:  Negative for arthralgias, back pain, gait problem, joint swelling, myalgias, neck pain and neck stiffness.   Skin:  Negative for color change, pallor and rash.   Allergic/Immunologic: Negative for environmental allergies, food allergies and immunocompromised state.   Neurological:  Negative for dizziness, tremors, seizures, syncope, facial asymmetry, speech difficulty, weakness, light-headedness, numbness and headaches.   Hematological:  Negative for adenopathy. Does not bruise/bleed easily.   Psychiatric/Behavioral:  Negative for agitation, behavioral problems, confusion, decreased concentration, dysphoric mood, hallucinations, " self-injury, sleep disturbance and suicidal ideas. The patient is not nervous/anxious and is not hyperactive.        Objective:      Physical Exam  Vitals reviewed.   Constitutional:       General: She is not in acute distress.     Appearance: She is well-developed. She is not diaphoretic.   HENT:      Head: Normocephalic and atraumatic.      Right Ear: External ear normal.      Left Ear: External ear normal.      Nose: Nose normal.      Right Sinus: No maxillary sinus tenderness or frontal sinus tenderness.      Left Sinus: No maxillary sinus tenderness or frontal sinus tenderness.      Mouth/Throat:      Pharynx: No oropharyngeal exudate.   Eyes:      General: Lids are normal. No scleral icterus.        Right eye: No discharge.         Left eye: No discharge.      Conjunctiva/sclera: Conjunctivae normal.      Right eye: Right conjunctiva is not injected. No hemorrhage.     Left eye: Left conjunctiva is not injected. No hemorrhage.     Pupils: Pupils are equal, round, and reactive to light.   Neck:      Thyroid: No thyromegaly.      Vascular: No JVD.      Trachea: No tracheal deviation.   Cardiovascular:      Rate and Rhythm: Normal rate.   Pulmonary:      Effort: Pulmonary effort is normal. No respiratory distress.      Breath sounds: No stridor.   Chest:      Chest wall: No tenderness.   Abdominal:      General: Bowel sounds are normal. There is no distension.      Palpations: Abdomen is soft. There is no hepatomegaly, splenomegaly or mass.      Tenderness: There is no abdominal tenderness. There is no rebound.   Musculoskeletal:         General: No tenderness. Normal range of motion.      Cervical back: Normal range of motion and neck supple.   Lymphadenopathy:      Cervical: No cervical adenopathy.      Upper Body:      Right upper body: No supraclavicular adenopathy.      Left upper body: No supraclavicular adenopathy.   Skin:     General: Skin is dry.      Findings: No erythema or rash.   Neurological:       Mental Status: She is alert and oriented to person, place, and time.      Cranial Nerves: No cranial nerve deficit.      Coordination: Coordination normal.   Psychiatric:         Behavior: Behavior normal.         Thought Content: Thought content normal.         Judgment: Judgment normal.             Assessment:      1. Malignant neoplasm of lower-inner quadrant of right breast of female, estrogen receptor positive    2. Malignant neoplasm of overlapping sites of right breast in female, estrogen receptor positive           Med Onc Chart Routing      Follow up with physician . Return to clinic after primary surgery completed   Follow up with [facility]    Infusion scheduling note    Injection scheduling note    Labs    Imaging    Pharmacy appointment    Other referrals                   Plan:     Extensive conversation with patient and .  Reviewed information with her copy of given discussed pros and cons of various treatment options plan course after germ line testing tomorrow.  Because of age would recommend PET scan be done for staging if not then CT bone scan.  Discussed recommendations this regard discussed the use of Oncotype testing in predicting patient need for systemic chemotherapy in rationale for adjuvant endocrine therapy both for systemic relapsed as well as 2nd primaries.  Discussed implications articles from up-to-date followed for review will be back in touch after PET scan CT bone scan if not approved for PET scan discussed implications        Yakov Okeefe Jr, MD FACP

## 2024-04-18 ENCOUNTER — TELEPHONE (OUTPATIENT)
Dept: HEMATOLOGY/ONCOLOGY | Facility: CLINIC | Age: 44
End: 2024-04-18
Payer: COMMERCIAL

## 2024-04-18 ENCOUNTER — OFFICE VISIT (OUTPATIENT)
Dept: SURGERY | Facility: CLINIC | Age: 44
End: 2024-04-18
Payer: COMMERCIAL

## 2024-04-18 ENCOUNTER — TUMOR BOARD CONFERENCE (OUTPATIENT)
Dept: SURGERY | Facility: CLINIC | Age: 44
End: 2024-04-18

## 2024-04-18 ENCOUNTER — PATIENT OUTREACH (OUTPATIENT)
Dept: SURGERY | Facility: CLINIC | Age: 44
End: 2024-04-18

## 2024-04-18 ENCOUNTER — LAB VISIT (OUTPATIENT)
Dept: LAB | Facility: HOSPITAL | Age: 44
End: 2024-04-18
Attending: NURSE PRACTITIONER
Payer: COMMERCIAL

## 2024-04-18 DIAGNOSIS — C50.311 MALIGNANT NEOPLASM OF LOWER-INNER QUADRANT OF RIGHT BREAST OF FEMALE, ESTROGEN RECEPTOR POSITIVE: Primary | ICD-10-CM

## 2024-04-18 DIAGNOSIS — C50.311 MALIGNANT NEOPLASM OF LOWER-INNER QUADRANT OF RIGHT BREAST OF FEMALE, ESTROGEN RECEPTOR POSITIVE: ICD-10-CM

## 2024-04-18 DIAGNOSIS — Z17.0 MALIGNANT NEOPLASM OF LOWER-INNER QUADRANT OF RIGHT BREAST OF FEMALE, ESTROGEN RECEPTOR POSITIVE: Primary | ICD-10-CM

## 2024-04-18 DIAGNOSIS — Z17.0 MALIGNANT NEOPLASM OF LOWER-INNER QUADRANT OF RIGHT BREAST OF FEMALE, ESTROGEN RECEPTOR POSITIVE: ICD-10-CM

## 2024-04-18 PROCEDURE — 99215 OFFICE O/P EST HI 40 MIN: CPT | Mod: S$GLB,,, | Performed by: NURSE PRACTITIONER

## 2024-04-18 PROCEDURE — 99358 PROLONG SERVICE W/O CONTACT: CPT | Mod: S$GLB,,, | Performed by: SURGERY

## 2024-04-18 PROCEDURE — 36415 COLL VENOUS BLD VENIPUNCTURE: CPT | Performed by: NURSE PRACTITIONER

## 2024-04-18 NOTE — PROGRESS NOTES
Interdisciplinary Breast Cancer Conference    Nakita Vaughn    Female    Date Presented to Tumor Board: 04/18/24    Presenting Hospital / Clinic: [facility] [place]    Tumor Laterality: Right    Breast Tumor Site: Overlapping sites    Presenter: Negrita Metzger MD    Reason for Consultation: Initial Presentation    Specialties Present: Medical Oncology;Hematology;Radiation Oncology;Surgical Oncology;Pathology;Navigation;Radiology;Genetics    Patient Status: a new patient    Treatment to Date: Genetic Counseling    Clinical Trial Eligibility: Not discussed    ER: Positive    OH: Positive    Her2: Negative    Cancer Staging   Malignant neoplasm of lower-inner quadrant of right breast of female, estrogen receptor positive  Staging form: Breast, AJCC 8th Edition  - Clinical stage from 4/14/2024: Stage IA (cT1b, cN0, cM0, G2, ER+, OH+, HER2-) - Signed by Negrita Metzger MD on 4/14/2024      Recommended Plan: Surgery;Genetic Testing;Endocrine Therapy  Dr. Okeefe recommends PET scan or CT CAP if not covered, breast MRI 4/19/24, genetics drawn 4/18 results pending. Pt wants bilateral mastectomy with reconstruction, seeing Dr. Pastrana for plastic surgery. MD wants to attempt oncotype testing on biopsy specimen- pending response from pathology if able.   Metastatic Site(s): None    Presentation at Cancer Conference: Prospective

## 2024-04-18 NOTE — PROGRESS NOTES
The patient was submitted for evaluation in multidisciplinary breast cancer conference ([facility]). Total time spent on preparation for [facility]CC was 35 minutes, which included review of the past medical history from the PCP, review of the mammogram and other imaging reports, care coordination with medical and radiation oncology, and documentation in the medical record.     Patient with early stage hormone receptor positive, [id number] negative ILC. Awaiting MRI and genetics to determine surgical plan. Medical oncology to determine adjuvant therapy via oncotype. Family planning discussed. No anticipated barriers to care.

## 2024-04-18 NOTE — TELEPHONE ENCOUNTER
Intern ETHAN Chong attempted to reach Ms. Vaughn via phone call. She was not available to answer and a voicemail was left requesting that she give a call back at her earliest convenience.

## 2024-04-19 ENCOUNTER — HOSPITAL ENCOUNTER (OUTPATIENT)
Dept: RADIOLOGY | Facility: HOSPITAL | Age: 44
Discharge: HOME | End: 2024-04-19
Attending: SURGERY
Payer: COMMERCIAL

## 2024-04-19 DIAGNOSIS — C50.811 MALIGNANT NEOPLASM OF OVERLAPPING SITES OF RIGHT BREAST IN FEMALE, ESTROGEN RECEPTOR POSITIVE: ICD-10-CM

## 2024-04-19 DIAGNOSIS — Z17.0 MALIGNANT NEOPLASM OF OVERLAPPING SITES OF RIGHT BREAST IN FEMALE, ESTROGEN RECEPTOR POSITIVE: ICD-10-CM

## 2024-04-19 PROCEDURE — A9577 INJ MULTIHANCE: HCPCS | Mod: PN | Performed by: SURGERY

## 2024-04-19 PROCEDURE — 25500020 PHARM REV CODE 255: Mod: PN | Performed by: SURGERY

## 2024-04-19 PROCEDURE — 77049 MRI BREAST C-+ W/CAD BI: CPT | Mod: TC,PN

## 2024-04-19 PROCEDURE — 77049 MRI BREAST C-+ W/CAD BI: CPT | Mod: 26,,, | Performed by: RADIOLOGY

## 2024-04-19 RX ORDER — DIAZEPAM 5 MG/1
5 TABLET ORAL EVERY 6 HOURS PRN
Qty: 2 TABLET | Refills: 0 | Status: SHIPPED | OUTPATIENT
Start: 2024-04-19 | End: 2024-04-21

## 2024-04-19 RX ADMIN — GADOBENATE DIMEGLUMINE 15 ML: 529 INJECTION, SOLUTION INTRAVENOUS at 01:04

## 2024-04-25 ENCOUNTER — HOSPITAL ENCOUNTER (OUTPATIENT)
Dept: RADIOLOGY | Facility: HOSPITAL | Age: 44
Discharge: HOME | End: 2024-04-25
Attending: INTERNAL MEDICINE
Payer: COMMERCIAL

## 2024-04-25 ENCOUNTER — DOCUMENTATION ONLY (OUTPATIENT)
Dept: SURGERY | Facility: CLINIC | Age: 44
End: 2024-04-25
Payer: COMMERCIAL

## 2024-04-25 ENCOUNTER — PATIENT MESSAGE (OUTPATIENT)
Dept: SURGERY | Facility: CLINIC | Age: 44
End: 2024-04-25
Payer: COMMERCIAL

## 2024-04-25 ENCOUNTER — CLINICAL SUPPORT (OUTPATIENT)
Dept: REHABILITATION | Facility: HOSPITAL | Age: 44
End: 2024-04-25
Attending: SURGERY
Payer: COMMERCIAL

## 2024-04-25 DIAGNOSIS — C50.811 MALIGNANT NEOPLASM OF OVERLAPPING SITES OF RIGHT BREAST IN FEMALE, ESTROGEN RECEPTOR POSITIVE: ICD-10-CM

## 2024-04-25 DIAGNOSIS — Z17.0 MALIGNANT NEOPLASM OF LOWER-INNER QUADRANT OF RIGHT BREAST OF FEMALE, ESTROGEN RECEPTOR POSITIVE: ICD-10-CM

## 2024-04-25 DIAGNOSIS — Z17.0 MALIGNANT NEOPLASM OF OVERLAPPING SITES OF RIGHT BREAST IN FEMALE, ESTROGEN RECEPTOR POSITIVE: ICD-10-CM

## 2024-04-25 DIAGNOSIS — C50.311 MALIGNANT NEOPLASM OF LOWER-INNER QUADRANT OF RIGHT BREAST OF FEMALE, ESTROGEN RECEPTOR POSITIVE: ICD-10-CM

## 2024-04-25 DIAGNOSIS — Z71.9 ENCOUNTER FOR EDUCATION: ICD-10-CM

## 2024-04-25 DIAGNOSIS — Z91.89 AT RISK FOR LYMPHEDEMA: Primary | ICD-10-CM

## 2024-04-25 DIAGNOSIS — Z91.89 AT RISK FOR SELF CARE DEFICIT: ICD-10-CM

## 2024-04-25 PROCEDURE — 97110 THERAPEUTIC EXERCISES: CPT

## 2024-04-25 PROCEDURE — 97165 OT EVAL LOW COMPLEX 30 MIN: CPT

## 2024-04-25 PROCEDURE — 78815 PET IMAGE W/CT SKULL-THIGH: CPT | Mod: TC,PI

## 2024-04-25 PROCEDURE — A9552 F18 FDG: HCPCS | Performed by: INTERNAL MEDICINE

## 2024-04-25 PROCEDURE — 78815 PET IMAGE W/CT SKULL-THIGH: CPT | Mod: 26,PI,, | Performed by: RADIOLOGY

## 2024-04-25 RX ORDER — FLUDEOXYGLUCOSE F18 500 MCI/ML
13.44 INJECTION INTRAVENOUS
Status: COMPLETED | OUTPATIENT
Start: 2024-04-25 | End: 2024-04-25

## 2024-04-25 RX ADMIN — FLUDEOXYGLUCOSE F-18 13.44 MILLICURIE: 500 INJECTION INTRAVENOUS at 11:04

## 2024-04-25 NOTE — PLAN OF CARE
[facility]   Outpatient Occupational Therapy Breast Cancer Pre-Operative Evaluation     Date: 4/25/2024  Name: Nakita Vaughn  Clinic Number: [id number]    Therapy Diagnosis:   Encounter Diagnoses   Name Primary?    Malignant neoplasm of lower-inner quadrant of right breast of female, estrogen receptor positive     At risk for lymphedema Yes    At risk for self care deficit     Encounter for education      Physician: Negrita Metzger MD    Physician Orders: Occupational Therapy to Evaluate and Treat  Medical Diagnosis: C50.311,Z17.0 (ICD-10-CM) - Malignant neoplasm of lower-inner quadrant of right breast of female, estrogen receptor positive     Evaluation Date: 4/25/2024  Insurance Authorization Period Expiration: 4/16/2024 - 4/16/2025  Plan of Care Certification Period: 4/25/2024 - 7/25/2024  Progress Note Due: N/A     Date of Return to MD: N/A    Visit # / Visits authorized: 1/1  FOTO: 1/3    Precautions:  Standard    Time In: 2:40  Time Out: 3:20  Total Appointment Time (timed & untimed codes): 40 minutes    Subjective           Date of Onset: March 2024  History of Current Condition:   Nakita is a 43 y.o. female that presents to [facility] secondary to diagnosis of Malignant neoplasm of lower-inner quadrant of right breast of female, estrogen receptor positive  breast cancer. A right breast abnormality was identified on routine screening mammography.  Focused sonographic evaluation revealed a 7 mm x 6 mm x 7 mm irregularly shaped, non-parallel mass with angular margins seen in the inner region of the right breast at 4 o'clock radian, 6 cm from the nipple. No axillary adenopathy. Core needle biopsy confirmed hormone receptor positive, xle0nat negative invasive lobular carcinoma. Patient is electing to have a bilateral mastectomy with CAROLYN Flap reconstruction, date TBD.       Patient presents today to perform baseline measurements pre-surgery to be able to  detect lymphedema post surgery, upper extremity muscle testing, postural and range of motion assessment along with education of risk of lymphedema and surgical precautions post surgery. Circumferential measurements will also be taken pre-surgery of bilateral upper extremities for early detection of lymphedema post surgery. Patient will also be instructed in exercises to perform pre-surgery to insure best outcomes post surgery.       Staging: Stage IA (T1b N0 Mx) RIGHT Breast Invasive Lobular Carcinoma, Grade 2, ER %, IN %, Njd6mtd 0 with a ki67 of 15%   Surgical Procedure and Date: bilateral mastectomy with CAROLYN Flap reconstruction, date TBD.   Chemotherapy: TBD  Radiation: TBD      Involved Side: right  Hand Dominance: Right    Previous Therapy: None for prehabilitation     Patients Goals: Patient reported goals are to learn necessary education for post-surgery care, exercises and decrease risk for lymphedema for return to prior functional level post surgery.     Falls: None    Pain:  Pain Related Behaviors Observed: No  Functional Pain Scale Rating 0-10:   Average: 0/10       Occupation: Project Development  Working Presently: Employed    Functional Limitations/Social History:    Current Level of Function: Independent with all ADL, IADL, community mobility and functional activities.    Limitation of Functional Status as follows:   ADLs/IADLs: See Below in Objective Section    Nutrition:  Normal  Exercise Routine Prior to Onset : Active  Home/Living Environment : lives with their family  Home Access: Accessible  DME: None       Past Medical History/Physical Systems Review:     Past Medical History:  Nakita Vaughn  has a past medical history of Malignant neoplasm of lower-inner quadrant of right breast of female, estrogen receptor positive, Shortness of breath, and Snoring.    Past Surgical History:  Nakita Vaughn  has a past surgical history that includes  section and  section  with tubal ligation.    Current Medications:  Nakita has a current medication list which includes the following prescription(s): albuterol and diazepam.    Allergies:  Review of patient's allergies indicates:  No Known Allergies                 Objective     Activities of Daily Living:    Activity of Daily Living  4/25/2024   Feeding Independent   Grooming    Hygiene    Toileting    Upper Body Dressing    Lower Body Dressing    Bathing        Instrumental Activities of Daily Living:    Instrumental Activity of Daily Living 4/25/2024   Homecare Independent    Cooking    Laundry    Driving    Yard Work    Use of Telephone    Money Management    Medication Management    Handwriting    Technology Use        Functional Mobility:    Functional Mobility 4/25/2024   Bed mobility Independent    Roll to left    Roll to right    Supine to prone    Scooting to edge of bed    Supine to sit    Sit to supine    Transfers to bed    Transfers to toilet    Sit to Stand    Stand Pivot    Car Transfers        Gait Assessment:   -    Assistive Devices Used: None  -    Assistance: Independent  -    Distance: Community distances     Endurance Deficit: None    Posture/Alignment :  Postural examination/scapula alignment: Within normal limits   Joint integrity: Within functional limits   Skin integrity: Intact  Edema: None noted    Range of Motion:    Joint Evaluation  AROM  4/25/2024 AROM  4/25/2024    Left Right   Shoulder Flexion 0-180 Within normal limits    Within normal limits    Shoulder Abduction 0-180     Shoulder External Rotation 0-90     Shoulder Internal Rotation 0-90     Shoulder Extension 0-60     Shoulder Horizontal Adduction 0-90         Strength:    Strength 4/25/2024 4/25/2024    Left Right   Shoulder Flexion 5/5 5/5   Shoulder Abduction     Shoulder External Rotation      Shoulder Internal Rotation     Shoulder Extension     Shoulder Horizontal Adduction     Elbow Flexion     Elbow Extension         Baseline Measurements  "of bilateral upper extremities for early detection of Lymphedema:     LANDMARK RIGHT LEFT   E + 8" 39 cm 39 cm   E + 6" 35 cm 35.5 cm   E + 4" 33 cm 34 cm   E + 2" 30 cm 33 cm   Elbow 27.5 cm 28.5 cm   W+ 8" 27 cm 27 cm   W +  6" 26 cm 25 cm   W + 4" 21 cm 21 cm   Wrist 17 cm 17 cm   DPC 20 cm 19.5 cm   IP Thumb 6.5 cm 6.5 cm     Coordination:   -     Fine Motor Coordination: Intact  -     Upper Extremity Coordination: Intact  -     Lower Extremity Coordination: Intact    Sensation:  Nakita  reports normal sensation    -    Light touch: Intact    -    Sharp/Dull: Intact    -    Proprioception: Intact    Mental status : within normal limits     Treatment and Patient Education     Total Time Separate from Evaluation: 15 minutes    Patient participated in self care/home management for 5 minutes including the following:     -    Role of Occupational Therapy in multidisciplinary team, goals for Occupational Therapy  -    Patient was educated in lymphedema etiology and management plans.    -    Patient was provided with written risk reductions and precautions for managing lymphedema  -    Patient was educated on axillary cording and how to identify  -    Reviewed RUTH drain care instructions.     Range of Motion/ Lifting Precautions post surgery - until drains have been removed:  -    Do not lift affected arm above 90 degrees of shoulder flexion  -    Do not lift over 5 lbs  -    Do not pull or push heavy objects  -    Do not sleep on your stomach or surgery side     Patient was instructed in and performed therapeutic exercise for 10 minutes for postural correction and alignment, stretching and soft tissue mobility.   Exercises included:     Therapeutic Exercise 4/25/2024    Diaphragmatic breathing and relaxation x 5x   Scapular retractions x 3 sets of 10x   Fist making/ball squeezes x 2 minutes   Elbow flexion/extension x 3 sets of 10x   Wall walks (max range shoulder level)  -Flexion  -Abduction x 2 sets of 10x "   Pendulums  - front/back  - side to side  - circles both ways x 10x each direction                    Patient was able to demonstrate and report understanding and performance    Written Home Exercises Provided: yes.  Exercises were reviewed and Nakita was able to demonstrate them prior to the end of the session.  Nakita demonstrated good  understanding of the education provided.     See EMR under Patient Instructions for exercises provided 4/25/2024.    Assessment     This is a 43 y.o. female referred to outpatient occupational therapy and presents with a medical diagnosis of Malignant neoplasm of lower-inner quadrant of right breast of female, estrogen receptor positive  breast cancer and was seen today pre-operatively to assess strength and range of motion of bilateral upper extremities, to take baseline circumferential measurements of bilateral upper extremities to aid in the early detection of lymphedema, educated on axillary cording and aid in its detection and provide patient education on exercises/precautions post breast surgery. Patient does not exhibit any range of motion impairments    Anticipated barriers to Occupational Therapy: None     Plan of care discussed with patient: Yes  Patient's spiritual, cultural and educational needs considered and patient is agreeable to the plan of care and goals as stated below:     Medical Necessity is demonstrated by the following  Occupational Profile/History  Co-morbidities and personal factors that may impact the plan of care [] LOW: Brief chart review  [x] MODERATE: Expanded chart review   [] HIGH: Extensive chart review    Moderate / High Support Documentation:   Past Medical History:   Diagnosis Date    Malignant neoplasm of lower-inner quadrant of right breast of female, estrogen receptor positive 04/14/2024    Shortness of breath 04/30/2021    Snoring 04/30/2021        Examination  Performance deficits relating to physical, cognitive or psychosocial skills that  result in activity limitations and/or participation restrictions  [x] LOW: addressing 1-3 Performance deficits  [] MODERATE: 3-5 Performance deficits  [] HIGH: 5+ Performance deficits (please support below)    Moderate / High Support Documentation:    Physical:  No Deficits    Cognitive:  No Deficits    Psychosocial:    No Deficits     Treatment Options [x] LOW: Multiple options  [] MODERATE: Several options  [] HIGH: Limited options      Decision Making/ Complexity Score: low          Plan     Schedule patient for follow up with Occupational therapy post surgery. Goals for therapy post surgery will be established at that time.     Shelley Leon, OT ,OTD, OTR/L

## 2024-04-25 NOTE — PROGRESS NOTES
Tried to reach pt regarding genetic testing results via phone- STAT panel of 9 breast cancer genes reveals no mutation - negative test. Will message through pt portal.

## 2024-04-29 ENCOUNTER — DOCUMENTATION ONLY (OUTPATIENT)
Dept: SURGERY | Facility: CLINIC | Age: 44
End: 2024-04-29
Payer: COMMERCIAL

## 2024-04-29 DIAGNOSIS — Z15.89 MONOALLELIC MUTATION OF MITF GENE: Primary | ICD-10-CM

## 2024-04-29 DIAGNOSIS — Z15.09 MONOALLELIC MUTATION OF MITF GENE: Primary | ICD-10-CM

## 2024-04-29 PROBLEM — Z91.89 AT RISK FOR LYMPHEDEMA: Status: ACTIVE | Noted: 2024-04-29

## 2024-04-29 PROBLEM — Z71.9 ENCOUNTER FOR EDUCATION: Status: ACTIVE | Noted: 2024-04-29

## 2024-04-29 PROBLEM — Z91.89 AT RISK FOR SELF CARE DEFICIT: Status: ACTIVE | Noted: 2024-04-29

## 2024-04-29 NOTE — PROGRESS NOTES
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 Explained results and need for dermatology f/u and for formal genetic counseling. Referrals placed. Pt has appt with [facility] genetic online counseling tomorrow. Answered pt questions. Results will be scanned into media section.

## 2024-04-29 NOTE — PROGRESS NOTES
Tried to reach pt by phone regarding genetic testing results from the 70 gene multicancer panel. Left voicemail to please call for results today with contact number.

## 2024-05-20 NOTE — PROGRESS NOTES
Breast Surgical Oncology  [place]    Date of Service: 2024    SUBJECTIVE:   Chief complaint: right breast cancer    HISTORY OF PRESENT ILLNESS:   Nakita Vaughn is a 43 y.o. female who is kindly referred by Chencho Diaz NP for right breast cancer.    4/15/24  A right breast abnormality was identified on routine screening mammography.  Focused sonographic evaluation revealed a 7 mm x 6 mm x 7 mm irregularly shaped, non-parallel mass with angular margins seen in the inner region of the right breast at 4 o'clock radian, 6 cm from the nipple. No axillary adenopathy. Core needle biopsy confirmed hormone receptor positive, clh0koc negative invasive lobular carcinoma. She denies breast concerns such as pain, masses, skin changes, nipple discharge, nipple retraction or lumps under the arm.  She denies prior breast surgery or biopsy.     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 Breast MRI showed no multicentric or multifocal disease. She has met with Dr. Pastrana to explore reconstructive options. She has also been assessed by OT for prehabilitation.     Her breast cancer risk factor profile is as follows: Menarche at 11, Menopause at N/A.  She is . Age at first live birth was 19. Family history of cancer is as follows: maternal grandmother with ovarian cancer at age 63,  at age 65.     FAMILY HISTORY:     Family History   Problem Relation Name Age of Onset    Diabetes Mother      Hypertension Mother      COPD Mother      Ovarian cancer Maternal Grandmother  63    Breast cancer Neg Hx      Colon cancer Neg Hx          PAST MEDICAL HISTORY:     Past Medical History:   Diagnosis Date    Malignant neoplasm of lower-inner quadrant of right breast of female, estrogen receptor positive 2024    Shortness of breath 2021    Snoring 2021       SURGICAL HISTORY:     Past Surgical History:   Procedure Laterality Date     SECTION      x2      SECTION WITH TUBAL LIGATION         SOCIAL HISTORY:     Social History     Tobacco Use    Smoking status: Never    Smokeless tobacco: Never   Substance Use Topics    Alcohol use: No    Drug use: No        MEDICATIONS/ALLERGIES:     Current Outpatient Medications:     albuterol (PROVENTIL/VENTOLIN HFA) 90 mcg/actuation inhaler, Inhale 2 puffs into the lungs every 6 (six) hours as needed for Wheezing. Rescue, Disp: 18 g, Rfl: 0    diazePAM (VALIUM) 5 MG tablet, Take 1 tablet (5 mg total) by mouth every 6 (six) hours as needed for Anxiety., Disp: 2 tablet, Rfl: 0  Review of patient's allergies indicates:  No Known Allergies    REVIEW OF SYSTEMS:   I have reviewed 12 systems, including 2 points per system. Pertinent reported positives are: none    PHYSICAL EXAM:   General: The patient appears well and is in no acute distress.     Shelli So RN was present as a chaperone for the examination.   BREAST EXAM  No Asymmetry  Right:  - Mass: No  - Skin change: post biopsy ecchymosis LIQ  - Nipple Discharge: No  - Nipple retraction: No  - Axillary LAD: No  Left:   - Mass: No  - Skin change: No  - Nipple Discharge: No  - Nipple retraction: No  - Axillary LAD: No    IMAGING:   Images were personally reviewed.     Results for orders placed during the hospital encounter of 04/19/24    MRI Breast w/wo Contrast, w/CAD, Bilateral    Narrative  Result:  MRI Breast w/wo Contrast, w/CAD, Bilateral    History:  Patient is 43 y.o. and is seen for malignant neoplasm of overlapping sites of right breast in female, estrogen receptor positive.      Films Compared:  Compared to: 04/09/2024 US Breast Biopsy with Imaging 1st site Right, 04/09/2024 Mammo Digital Diagnostic Right, 04/02/2024 Mammo Digital Diagnostic Right with Chin, 04/02/2024 US Breast Right Limited, and 03/28/2024 Mammo Digital Screening Bilat w/ Chin    Technique:  A routine breast MRI was performed with a dedicated breast coil. Pre-contrast STIR were acquired. Then, pre and  post contrast T1 weighted fat saturated images were acquired and subtracted with MIP reconstruction. 13 mL of intravenous gadolinium contrast was administered. The study was reviewed with [employer] software.    Findings:  The breasts have scattered fibroglandular tissue. The background parenchymal enhancement is moderate and symmetric.    Right  There is a 23 mm x 13 mm x 11 mm irregularly shaped, homogeneous mass with irregular margins seen in the lower inner quadrant of the right breast at 4 o'clock in the posterior depth, 7 cm from the nipple, which is hyperintense on T2 weighted images. The mass correlates with the prior mammogram finding and ultrasound finding.  Bilateral nonspecific subcentimeter axillary lymph nodes noted, none considered pathologically enlarged by size or morphologic criteria.    Left  There is no evidence of suspicious masses, abnormal enhancement, or other abnormal findings in the left breast.    Impression  Right  Mass: Right breast 23 mm x 13 mm x 11 mm mass at the lower inner posterior 4 o'clock position. Assessment: 6 - Known biopsy, proven malignancy.    Left  There is no MR evidence of malignancy in the left breast.    BI-RADS Category:  Overall: 6 - Known Biopsy-Proven Malignancy    Recommendation:  Surgical Consult is recommended for the right breast.    Results for orders placed during the hospital encounter of 04/02/24    Mammo Digital Diagnostic Right with Chin    Narrative  Result:  Mammo Digital Diagnostic Right with Chin  US Breast Right Limited    History:  Patient is 43 y.o. and is seen for diagnostic imaging.    Films Compared:  Compared to: 03/28/2024 Mammo Digital Screening Bilat w/ Chin, 01/11/2023 Mammo Digital Screening Bilat w/ Chin, and 05/07/2021 Mammo Digital Screening Bilat w/ Chin    Findings:  This procedure was performed using tomosynthesis. Computer-aided detection was utilized in the interpretation of this examination.  Mammo Digital Diagnostic Right with  Chin  The right breast has scattered areas of fibroglandular density.  There is an asymmetry seen in the inner region of the right breast in the posterior depth.    US Breast Right Limited  There is a 7 mm x 6 mm x 7 mm irregularly shaped, non-parallel mass with angular margins seen in the inner region of the right breast at 4 o'clock in the posterior depth. No axillary adenopathy.    Impression  Right  Asymmetry: Right breast asymmetry at the inner posterior position. Assessment: 4C - Suspicious finding. Ultrasound-guided biopsy is recommended.    BI-RADS Category:  Overall: 4 - Suspicious      Recommendation:  Ultrasound-guided biopsy is recommended.      Your estimated lifetime risk of breast cancer (to age 85) based on Tyrer-Cuzick risk assessment model is Tyrer-Cuzick: 5.85 %. According to the [employer], patients with a lifetime breast cancer risk of 20% or higher might benefit from supplemental screening tests.      PATHOLOGY:     Lab Results   Component Value Date    FPATHDX  04/09/2024     Right breast 4:00, biopsy:  Invasive lobular carcinoma, Grade 2 (tubules = 3, nuclei = 2, mitoses = 1), measuring 6 mm (0.6 cm) in greatest linear dimension within the core biopsies  Lobular carcinoma in-situ (LCIS), classic type    Breast molecular markers:  ER:  Positive (%, strong)  WY:  Positive (%, strong)  HER2 IHC:  Negative (score 0)   Ki-67:  15%       ASSESSMENT:     1. Malignant neoplasm of lower-inner quadrant of right breast of female, estrogen receptor positive          PLAN:     Nakita Vaughn is a 43 y.o. female who is new diagnosis of Stage IB (T2 N0 Mx) RIGHT Breast Invasive Lobular Carcinoma, Grade 2, ER %, WY %, [id number] 0 with a ki67 of 15%.     After careful consideration of her options, she has elected for a bilateral nipple sparing mastectomy with right sentinel lymph node biopsy. Dr. Pastrana is to perform reconstruction. The risks of surgery were discussed  with the patient, including pain, bleeding, infections, scarring, cosmetic deformity, numbness, necrosis / loss of skin and the nipple areolar complex, lymphedema, injury to adjacent structures such as nerves that affect movement of the arm, need for additional surgery for margins or lymph nodes, need for additional treatments and recurrence. She has provided informed consent.  She will be scheduled at the next available operating room time.     I spent a total of 45 minutes on this visit. This includes face to face time and non-face to face time preparing to see the patient (eg, review of tests), obtaining and/or reviewing separately obtained history, documenting clinical information in the electronic or other health record, independently interpreting results and communicating results to the patient/family/caregiver, or care coordinator.        Negrita Metzger M.D.

## 2024-05-21 ENCOUNTER — HOSPITAL ENCOUNTER (OUTPATIENT)
Dept: CARDIOLOGY | Facility: HOSPITAL | Age: 44
Discharge: HOME | End: 2024-05-21
Attending: SURGERY
Payer: COMMERCIAL

## 2024-05-21 ENCOUNTER — OFFICE VISIT (OUTPATIENT)
Dept: SURGERY | Facility: CLINIC | Age: 44
End: 2024-05-21
Payer: COMMERCIAL

## 2024-05-21 ENCOUNTER — HOSPITAL ENCOUNTER (OUTPATIENT)
Dept: RADIOLOGY | Facility: HOSPITAL | Age: 44
Discharge: HOME | End: 2024-05-21
Attending: SURGERY
Payer: COMMERCIAL

## 2024-05-21 DIAGNOSIS — Z17.0 MALIGNANT NEOPLASM OF LOWER-INNER QUADRANT OF RIGHT BREAST OF FEMALE, ESTROGEN RECEPTOR POSITIVE: ICD-10-CM

## 2024-05-21 DIAGNOSIS — C50.311 MALIGNANT NEOPLASM OF LOWER-INNER QUADRANT OF RIGHT BREAST OF FEMALE, ESTROGEN RECEPTOR POSITIVE: ICD-10-CM

## 2024-05-21 DIAGNOSIS — C50.311 MALIGNANT NEOPLASM OF LOWER-INNER QUADRANT OF RIGHT BREAST OF FEMALE, ESTROGEN RECEPTOR POSITIVE: Primary | ICD-10-CM

## 2024-05-21 DIAGNOSIS — Z17.0 MALIGNANT NEOPLASM OF LOWER-INNER QUADRANT OF RIGHT BREAST OF FEMALE, ESTROGEN RECEPTOR POSITIVE: Primary | ICD-10-CM

## 2024-05-21 LAB
OHS QRS DURATION: 76 MS
OHS QTC CALCULATION: 380 MS

## 2024-05-21 PROCEDURE — 71046 X-RAY EXAM CHEST 2 VIEWS: CPT | Mod: TC

## 2024-05-21 PROCEDURE — 99999 PR PBB SHADOW E&M-EST. PATIENT-LVL I: CPT | Mod: PBBFAC,,, | Performed by: SURGERY

## 2024-05-21 PROCEDURE — 71046 X-RAY EXAM CHEST 2 VIEWS: CPT | Mod: 26,,, | Performed by: RADIOLOGY

## 2024-05-21 PROCEDURE — 93005 ELECTROCARDIOGRAM TRACING: CPT

## 2024-05-21 PROCEDURE — 99215 OFFICE O/P EST HI 40 MIN: CPT | Mod: S$GLB,,, | Performed by: SURGERY

## 2024-05-21 PROCEDURE — 93010 ELECTROCARDIOGRAM REPORT: CPT | Mod: ,,, | Performed by: INTERNAL MEDICINE

## 2024-06-03 ENCOUNTER — OUTSIDE PLACE OF SERVICE (OUTPATIENT)
Dept: SURGERY | Facility: CLINIC | Age: 44
End: 2024-06-03
Payer: COMMERCIAL

## 2024-06-03 DIAGNOSIS — C50.311 MALIGNANT NEOPLASM OF LOWER-INNER QUADRANT OF RIGHT BREAST OF FEMALE, ESTROGEN RECEPTOR POSITIVE: Primary | ICD-10-CM

## 2024-06-03 DIAGNOSIS — Z17.0 MALIGNANT NEOPLASM OF LOWER-INNER QUADRANT OF RIGHT BREAST OF FEMALE, ESTROGEN RECEPTOR POSITIVE: Primary | ICD-10-CM

## 2024-06-18 ENCOUNTER — CLINICAL SUPPORT (OUTPATIENT)
Dept: REHABILITATION | Facility: HOSPITAL | Age: 44
End: 2024-06-18
Attending: SURGERY
Payer: COMMERCIAL

## 2024-06-18 DIAGNOSIS — Z71.9 ENCOUNTER FOR EDUCATION: ICD-10-CM

## 2024-06-18 DIAGNOSIS — M25.60 DECREASED RANGE OF MOTION: ICD-10-CM

## 2024-06-18 DIAGNOSIS — R52 PAIN: ICD-10-CM

## 2024-06-18 DIAGNOSIS — Z78.9 DECREASED ACTIVITIES OF DAILY LIVING (ADL): ICD-10-CM

## 2024-06-18 DIAGNOSIS — Z91.89 AT RISK FOR SELF CARE DEFICIT: ICD-10-CM

## 2024-06-18 DIAGNOSIS — Z91.89 AT RISK FOR LYMPHEDEMA: Primary | ICD-10-CM

## 2024-06-18 DIAGNOSIS — R53.1 DECREASED STRENGTH: ICD-10-CM

## 2024-06-18 PROCEDURE — 97535 SELF CARE MNGMENT TRAINING: CPT

## 2024-06-18 PROCEDURE — 97140 MANUAL THERAPY 1/> REGIONS: CPT

## 2024-06-18 NOTE — PROGRESS NOTES
[facility]  Occupational Therapy Treatment Note     Date: 6/18/2024  Name: Nakita Vaughn  Clinic Number: [id number]    Therapy Diagnosis:   Encounter Diagnoses   Name Primary?    At risk for lymphedema Yes    At risk for self care deficit     Encounter for education     Pain     Decreased range of motion     Decreased strength     Decreased activities of daily living (ADL)      Physician: Negrita Metzger MD    Physician Orders: Occupational Therapy to Evaluate and Treat  Medical Diagnosis: C50.311,Z17.0 (ICD-10-CM) - Malignant neoplasm of lower-inner quadrant of right breast of female, estrogen receptor positive      Evaluation Date: 4/25/2024  Updated Plan of Care: 6/18/2024  Insurance Authorization Period Expiration: 5/23/2024 - 12/31/2024  Plan of Care Certification Period: 6/18/2024 - 12/31/2024  Progress Note Due: 7/18/2024      Date of Return to MD: N/A     Visit # / Visits authorized: 1/20 (2 Episode Visits)  FOTO: 1/1     Precautions:  Standard, Limb Restriction     Time In: 11:00  Time Out: 11:30  Total Appointment Time (timed & untimed codes): 30 minutes    Subjective     Patient reports: She is doing well. She is recovering post surgery and still has one drain in place near her abdomen     she was compliant with home exercise program given last session.   Response to previous treatment: Tolerated well, No adverse effects  Functional change: Improved knowledge of HEP    Pain: 5/10  Location: bilateral breasts, axilla and abdomen     Objective     Objective measures updated at progress report unless specified.    Treatment     Supervised Modalities: Modalities such as hot/cold packs, traction, unattended electrical stimulation, paraffin bath, fluidotherapy to reduce pain and increase soft tissue extensibility. Nakita received (0) minutes of modalities listed below after being cleared for contraindications.  x = modalities performed     Supervised Modalities 6/18/2024                             Manual Therapy Techniques: Joint mobilizations, Soft tissue Mobilization, and mobilization with movement applied to the area listed below. Nakita received (15) minutes of interventions. x = intervention performed today    Manual Intervention 6/18/2024    Extensive Soft Tissue Mobilization/Myofacial Release   - Cervical, Upper trap, Clavicular, Intercostal, Subscapularis, Latissimus dorsi, Rhomboids x   to increase blood flow/circulation, improve soft tissue pliability, decrease pain and decrease sensitivity    Myofacial Release, IASTM, Cupping.      Axillary Web Syndrome release techniques     Manual Lymphatic Drainage     Joint Mobilizations     Mobilization with movement     Scar Management x   to increase blood flow/circulation, improve soft tissue pliability, decrease sensitivity, and reduce risk of hypertropic scarring and adhesions. Scar management performed 3 ways. Patient educated on self scar management with clean hands, on completely healed incisions.          Therapeutic Exercises: to develop strength, endurance, ROM, flexibility, posture and core stabilization. Nakita received (0) minutes of exercises listed below. x = performed today * = level of progression of activity     Therapeutic Exercise 6/18/2024    Diaphragmatic breathing and relaxation x 5x   Scapular retractions x 3 sets of 10x   Fist making/ball squeezes x 2 minutes   Elbow flexion/extension x 3 sets of 10x   Wall walks   -Flexion  -Abduction x 2 sets of 10x   Pendulums  - front/back  -side to side  -circles both ways x 10x each direction                     Therapeutic Activities: Everyday tasks to address the combined need of improved strength, range of motion, and endurance to achieve increased independence. While simulating these activities, the person is applying the gained skills of strength and improved range of motion in a practical way.  Nakita received (0) minutes of activities listed below. x = activities performed today *  = level of progression of activity     Therapeutic Activities 6/18/2024                          Neuromuscular Re-education: the use of functional strengthening, stretching, balancing and coordination activities that train the nerves and muscles to react and communicate to restore normal body movement patterns to increase self care independence. Nakita received (0) minutes of interventions listed below.  x = interventions performed today * = level of progression of activity     Neuromuscular Re-education 6/18/2024                          Self Care: Fundamental skills required to independently care for oneself. Activities of daily living such as eating, bathing, dressing, toileting, grooming, sleeping, transfers and mobility. Instrumental activities of daily living such as driving, medication management, pet care, home management/cleaning, financial management, using the phone, meal preparation and shopping. Nakita received (15) minutes of interventions listed below.  x = interventions performed * = level of progression of activity     Self Care 6/18/2024    Patient was educated in lymphedema etiology and management plans.   x    Patient was educated on axillary cording and how to identify  x                  Home Exercises and Education Provided     Education provided:   - Importance of self breast massage, and self scar massage   - Action plan for infection  - progress towards goals     Written Home Exercises Provided: yes.  Exercises were reviewed and Nakita was able to demonstrate them prior to the end of the session. Nakita demonstrated good understanding of the home exercise program provided.     See EMR under Patient Instructions for exercises provided 6/18/2024.        Assessment     Patient tolerated manual well with no complaints of pain. she was educated on breast and scar massage education and how to identify axillary cording and lymphedema. Patient to continue wall slides for left shoulder range of motion      Nakita is progressing towards her goals and there are no updates to goals at this time. Patient prognosis is Good.     Patient will continue to benefit from skilled outpatient occupational therapy to address the deficits listed in the problem list on initial evaluation provide patient/family education and to maximize patient's level of independence in the home and community environment.     Patient's spiritual, cultural and educational needs considered and patient agreeable to plan of care and goals.    Anticipated barriers to occupational therapy: Pain    Goals:     Short Term Goals:  6 weeks Progress    Pain: Patient will demonstrate improved pain by reports of less than or equal to 4/10 worst pain on the verbal rating scale in order to progress toward maximal functional ability and improve quality of life. PC   Mobility: Patient will improve active range of motion to 50% of stated goals, listed in objective measures above, in order to progress towards independence with functional activities.  PC   Strength: Patient will improve strength to 50% of stated goals, listed in objective measures above, in order to progress towards independence with functional activities.  PC   HEP: Patient will demonstrate independence with home exercise program in order to progress toward functional independence. PC   Lymphedema: Patient will demonstrate 100% understanding of lymphedema risk reduction practices to include self monitoring for lymphedema. PC   Massage: Patient will demonstrate 100% understanding of breast and scar massage practices to reduce risk of fibrosis, scar adhesions, range of motion impairments and pain.  PC      Long Term Goals:  End of POC Progress   Pain: Patient will demonstrate improved pain by reports of less than or equal to 1/10 worst pain on the verbal rating scale in order to progress toward maximal functional ability and improve quality of life.   PC   Mobility: Patient will improve active range of  motion to stated goals, listed in objective measures above, in order to return to maximal functional potential and improve quality of life. PC   Strength: Patient will improve strength to stated goals, listed in objective measures above, in order to improve functional independence and quality of life. PC   ADL: Patient will return to normal ADL's, IADL's, community involvement, recreational activities, and work-related activities with less than or equal to 0/10 pain, full/maximized tissue mobility and maximal function.  PC      Goals Key:  PC= Progressing/Continue;       PM= Partially Met;       GM= Goal Met,      DC= Discontinue    Plan     Continue Plan of Care (POC) and progress per patient tolerance.      Shelley Leon, OT  ,OTD, OTR/L, CLT

## 2024-06-19 PROBLEM — Z78.9 DECREASED ACTIVITIES OF DAILY LIVING (ADL): Status: ACTIVE | Noted: 2024-06-19

## 2024-06-19 PROBLEM — R52 PAIN: Status: ACTIVE | Noted: 2024-06-19

## 2024-06-19 PROBLEM — R53.1 DECREASED STRENGTH: Status: ACTIVE | Noted: 2024-06-19

## 2024-06-19 PROBLEM — M25.60 DECREASED RANGE OF MOTION: Status: ACTIVE | Noted: 2024-06-19

## 2024-06-19 NOTE — PLAN OF CARE
[facility]   Outpatient Occupational Therapy Breast Cancer   Post-Operative Updated Plan of Care     Date: 6/18/2024  Name: Nakita Vaughn  Clinic Number: [id number]    Therapy Diagnosis:   Encounter Diagnoses   Name Primary?    At risk for lymphedema Yes    At risk for self care deficit     Encounter for education     Pain     Decreased range of motion     Decreased strength     Decreased activities of daily living (ADL)      Physician: Negrita Metzger MD    Physician Orders: Occupational Therapy to Evaluate and Treat  Medical Diagnosis: C50.311,Z17.0 (ICD-10-CM) - Malignant neoplasm of lower-inner quadrant of right breast of female, estrogen receptor positive      Evaluation Date: 4/25/2024  Updated Plan of Care: 6/18/2024  Insurance Authorization Period Expiration: 5/23/2024 - 12/31/2024  Plan of Care Certification Period: 6/18/2024 - 12/31/2024  Progress Note Due: 7/18/2024     Date of Return to MD: N/A    Visit # / Visits authorized: 1/20 (2 Episode Visits)  FOTO: 1/1    Precautions:  Standard, Limb Restriction    Time In: 11:00  Time Out: 11:30  Total Appointment Time (timed & untimed codes): 30 minutes    Subjective           Date of Onset: March 2024  History of Current Condition:   Nakita is a 43 y.o. female that presents to [facility] secondary to diagnosis of Malignant neoplasm of lower-inner quadrant of right breast of female, estrogen receptor positive  breast cancer. A right breast abnormality was identified on routine screening mammography.  Focused sonographic evaluation revealed a 7 mm x 6 mm x 7 mm irregularly shaped, non-parallel mass with angular margins seen in the inner region of the right breast at 4 o'clock radian, 6 cm from the nipple. No axillary adenopathy. Core needle biopsy confirmed hormone receptor positive, [id number] negative invasive lobular carcinoma. Patient is s/p a bilateral nipple sparring mastectomy with CAROLYN Flap  reconstruction and right [place] Lymph Node Biopsy by Dr Metzger and Dr Pastrana on 06/03/2024        Staging Prior to Surgery: Stage IA (T1b N0 Mx) RIGHT Breast Invasive Lobular Carcinoma, Grade 2, ER %, MO %, [id number] 0 with a ki67 of 15%   Surgical Procedure and Date: bilateral nipple sparring mastectomy with CAROLYN Flap reconstruction and right [place] Lymph Node 06/03/2024  Surgeon:Dr Metzger and Dr Pastrana  Chemotherapy: N/A  Radiation: No  Immunotherapy: TBD    Involved Side: right  Hand Dominance: Right    Patients Goals: Patient reported goals are to decrease overall pain, improve mobility, decrease risk for lymphedema and to return to prior functional level.     Falls: None     Pain:  Pain Related Behaviors Observed: Yes   Functional Pain Scale Rating 0-10:   Average: 5/10   Best: 4/10   Worst: 7/10   Location: Bilateral breasts  Description: Aching  Aggravating Factors: movement  Easing Factors: rest    Occupation: Project [employer]  Working Presently: Employed on leave    Functional Limitations/Social History:    Prior Level of Function: Independent and pain free with all ADL, IADL, community mobility and functional activities.     Current Level of Function: Min A to Independent with all ADL, IADL, community mobility and functional activities with reports of increased pain and need for increased time and frequent breaks.      Limitation of Functional Status as follows:   ADLs/IADLs: See Below in Objective Section      Nutrition:  Normal  Exercise Routine Prior to Onset : Active  Home/Living Environment : lives with their family  Home Access: Accessible  DME: None       Past Medical History/Physical Systems Review:     Past Medical History:  Nakita Vaughn  has a past medical history of Malignant neoplasm of lower-inner quadrant of right breast of female, estrogen receptor positive, Shortness of breath, and Snoring.    Past Surgical History:  Nakita Vaughn  has a past surgical history  that includes  section and  section with tubal ligation.    Current Medications:  Nakita has a current medication list which includes the following prescription(s): albuterol and diazepam.    Allergies:  Review of patient's allergies indicates:  No Known Allergies                 Objective       Activities of Daily Living:    Activity of Daily Living  2024   Feeding Modified Independent   Grooming Minimal Assist   Hygiene Minimal Assist   Toileting Minimal Assist   Upper Body Dressing Minimal Assist   Lower Body Dressing Minimal Assist   Bathing Minimal Assist       Instrumental Activities of Daily Living:    Instrumental Activity of Daily Living 2024   Homecare Maximal Assist   Cooking Maximal Assist   Laundry Maximal Assist   Driving Maximal Assist   Yard Work Total Assist   Use of Telephone Modified Independent   Money Management Modified Independent   Medication Management Modified Independent   Handwriting Modified Independent   Technology Use Modified Independent       Gait Assessment:   -    Assistive Devices Used: None  -    Assistance: Independent  -    Distance: Community distances     Endurance Deficit: moderate    Posture/Alignment :  Postural examination/scapula alignment: Protracted scapula, Scapular dyskinesis     Skin Integrity:   Scar Location: bilateral breasts and abdomen   Appearance: clean, healing  Signs of infection: No  Drainage:clear    Edema: Moderate  Location: bilateral breasts/axilla    Axillary Web Syndrome/Cording:   Location: None  Degree of Cording: Absent  Number of cords present:     Range of Motion:    Joint Evaluation  AROM  2024 AROM  2024 Goal    Left Right    Shoulder Flexion 0-180 110 140 Within normal limits    Shoulder Abduction 0-180 110 140    Shoulder External Rotation 0-90 45 45    Shoulder Internal Rotation 0-90 45 45    Shoulder Extension 0-60 10 10    Shoulder Horizontal Adduction 0-90 45 45        Strength:    Strength 2024  "6/18/2024 Goal    Left Right    Shoulder Flexion 3+/5 3+/5 5/5    Shoulder Abduction      Shoulder External Rotation       Shoulder Internal Rotation      Shoulder Extension      Shoulder Horizontal Adduction      Elbow Flexion 4-/5 4-/5    Elbow Extension          Lymphedema Upper Extremity Circumferential Measurements (in centimeters):     LANDMARK RIGHT LEFT   E + 8" 39 cm 39 cm   E + 6" 35 cm 35.5 cm   E + 4" 33 cm 34 cm   E + 2" 30 cm 33 cm   Elbow 27.5 cm 28.5 cm   W+ 8" 27 cm 27 cm   W +  6" 26 cm 25 cm   W + 4" 21 cm 21 cm   Wrist 17 cm 17 cm   DPC 20 cm 19.5 cm   IP Thumb 6.5 cm 6.5 cm       Coordination:   -     Fine Motor Coordination: intact  -     Upper Extremity Coordination: intact  -     Lower Extremity Coordination: intact    Sensation:  Nakita  reports hypersensitivity of breast and axilla    Mental status : within normal limits       Treatment and Patient Education     Total Time Separate from Evaluation: 30 minutes. Please see daily treatment note dated 6/18/2024 for today's treatment session     Patient Education  -    Role of Occupational Therapy in multidisciplinary team, goals for Occupational Therapy  -    Patient was educated in lymphedema etiology and management plans.    -    Patient was provided with written risk reductions and precautions for managing lymphedema.   -    Patient was educated on axillary cording and how to identify       Patient was able to demonstrate and report understanding and performance    Written Home Exercises Provided: Yes  Exercises were reviewed and Nakita was able to demonstrate them prior to the end of the session.  Nakita demonstrated good understanding of the education provided.     See EMR under Patient Instructions for exercises provided 6/18/2024,      Assessment     This is a 43 y.o. female referred to outpatient occupational therapy and presents with a medical diagnosis of Malignant neoplasm of lower-inner quadrant of right breast of female, estrogen " receptor positive breast cancer and was seen today post-operatively to assess strength and range of motion of bilateral upper extremities, to take baseline circumferential measurements of bilateral upper extremities to aid in the early detection of lymphedema and provide patient education on exercises/precautions post breast surgery. Patient educated on breast and scar massage how to perform and how often.      Anticipated barriers to Occupational Therapy: Pain     Plan of care discussed with patient: Yes  Patient's spiritual, cultural and educational needs considered and patient is agreeable to the plan of care and goals as stated below:     Medical Necessity is demonstrated by the following  Occupational Profile/History  Co-morbidities and personal factors that may impact the plan of care [] LOW: Brief chart review  [x] MODERATE: Expanded chart review   [] HIGH: Extensive chart review    Moderate / High Support Documentation:   Past Medical History:   Diagnosis Date    Malignant neoplasm of lower-inner quadrant of right breast of female, estrogen receptor positive 04/14/2024    Shortness of breath 04/30/2021    Snoring 04/30/2021        Examination  Performance deficits relating to physical, cognitive or psychosocial skills that result in activity limitations and/or participation restrictions  [] LOW: addressing 1-3 Performance deficits  [x] MODERATE: 3-5 Performance deficits  [] HIGH: 5+ Performance deficits (please support below)    Moderate / High Support Documentation:    Physical:  Joint Mobility  Joint Stability  Muscle Power/Strength  Muscle Endurance  Skin Integrity/Scar Formation  Edema   Strength  Tactile Functions  Muscle Tone  Postural Control  Pain    Cognitive:  No Deficits    Psychosocial:    No Deficits     Treatment Options [] LOW: Multiple options  [x] MODERATE: Several options  [] HIGH: Limited options      Decision Making/ Complexity Score: moderate        Goals:    Short Term Goals:  6  weeks Progress    Pain: Patient will demonstrate improved pain by reports of less than or equal to 4/10 worst pain on the verbal rating scale in order to progress toward maximal functional ability and improve quality of life. PC   Mobility: Patient will improve active range of motion to 50% of stated goals, listed in objective measures above, in order to progress towards independence with functional activities.  PC   Strength: Patient will improve strength to 50% of stated goals, listed in objective measures above, in order to progress towards independence with functional activities.  PC   HEP: Patient will demonstrate independence with home exercise program in order to progress toward functional independence. PC   Lymphedema: Patient will demonstrate 100% understanding of lymphedema risk reduction practices to include self monitoring for lymphedema. PC   Massage: Patient will demonstrate 100% understanding of breast and scar massage practices to reduce risk of fibrosis, scar adhesions, range of motion impairments and pain.  PC     Long Term Goals:  End of POC Progress   Pain: Patient will demonstrate improved pain by reports of less than or equal to 1/10 worst pain on the verbal rating scale in order to progress toward maximal functional ability and improve quality of life.   PC   Mobility: Patient will improve active range of motion to stated goals, listed in objective measures above, in order to return to maximal functional potential and improve quality of life. PC   Strength: Patient will improve strength to stated goals, listed in objective measures above, in order to improve functional independence and quality of life. PC   ADL: Patient will return to normal ADL's, IADL's, community involvement, recreational activities, and work-related activities with less than or equal to 0/10 pain, full/maximized tissue mobility and maximal function.  PC     Goals Key:  PC= Progressing/Continue; PM= Partially Met;  GM= Goal  Met,      DC= Discontinue    Plan     Certification Period/Plan of Care Expiration: 6/18/2024 to 12/31/2024.    Outpatient Occupational Therapy 1-3 times weekly for 6 months to include the following interventions: Therapeutic Exercise, Functional Activities, Patient Education, Home Exercise Program, ADL Training, Transfer/Mobility Training, Manual Therapy, Neuromuscular Reeducation/Sensory, Orthotic Fabrication/Fit/Management, and Edema Control/Fluidotherapy.      Shelley Leon, OT ,OTD, OTR/L, CLT

## 2024-06-25 ENCOUNTER — OFFICE VISIT (OUTPATIENT)
Dept: SURGERY | Facility: CLINIC | Age: 44
End: 2024-06-25
Payer: COMMERCIAL

## 2024-06-25 DIAGNOSIS — Z17.0 MALIGNANT NEOPLASM OF LOWER-INNER QUADRANT OF RIGHT BREAST OF FEMALE, ESTROGEN RECEPTOR POSITIVE: Primary | ICD-10-CM

## 2024-06-25 DIAGNOSIS — C50.311 MALIGNANT NEOPLASM OF LOWER-INNER QUADRANT OF RIGHT BREAST OF FEMALE, ESTROGEN RECEPTOR POSITIVE: Primary | ICD-10-CM

## 2024-06-25 PROCEDURE — 99024 POSTOP FOLLOW-UP VISIT: CPT | Mod: S$GLB,,, | Performed by: SURGERY

## 2024-06-25 NOTE — PROGRESS NOTES
Breast Surgical Oncology  Post-operative Visit      REFERRING PHYSICIAN:  Alex Rueda MD    MEDICAL ONCOLOGIST:    Yakov Okeefe M.D.   RADIATION ONCOLOGIST:   N/A    Date of Service: 06/25/2024    DIAGNOSIS:   This is a 43 y.o. female with Stage IA (T1c N0 M0) RIGHT Breast Invasive Lobular Carcinoma, Grade 2, ER %, SD %, Fpa0hrx 0 with a ki67 of 15%.      TREATMENT SUMMARY:   The patient is status post bilateral nipple sparing mastectomy and sentinel node biopsy on 6/3/24.  Final pathology showed T1c tumor with negative margins and no metastatic disease in her sentinel lymph node.    Tissue Exam  Order: [id number]  Component 3 wk ago   Final Diagnosis       1.  Left breast (958 g), prophylactic nipple/areolar sparing mastectomy:   Benign breast tissue with mild fibrocystic change, adenosis and papillary apocrine metaplasia  Unremarkable skeletal muscle fibers at posterior margin  Negative for atypia, neoplasia or malignancy     2.  Right breast (886 g), nipple/areolar sparing mastectomy:   Invasive lobular carcinoma & adjacent lobular carcinoma in-situ (LCIS)  (see Synoptic Report)     3.  Right axillary sentinel lymph node #1, biopsy:  Single lymph node with partial fatty replacement, negative for metastatic carcinoma  Immunostain for cytokeratin A negative for carcinoma   Immunostains performed by [facility] (see [id number])     4.  Grossly unremarkable bilateral rib cartilage (Gross Examination Only)       SYNOPTIC REPORT  Procedure:                                      Nipple/areolar sparing mastectomy  Specimen Laterality:                      Right  Specimen Integrity:                       Intact and oriented  Tumor Focality:                              Solitary focus of invasive carcinoma  Tumor Site:                                    Lower inner quadrant  Histologic Type:                             Invasive lobular carcinoma  Tumor Size:                                     19  Histologic Grade:                          Grade II of III  (Prashanth Histologic Score)     - Tubule Differentiation:             3    - Nuclear Pleomorphism:           2    - Mitotic Rate:                              1  Total Score:                                    6  In Situ Carcinoma:                         Adjacent lobular carcinoma in-situ (LCIS) present  Extent of Invasive Tumor:Not applicable (no skin submitted; skeletal muscle fibers not involved)  Microcalcifications:                       None identified  Margin Status (Invasive):               All margins negative for invasive carcinoma                                                          Carcinoma present 5 mm from nearest anterior inferior inked margin  Margin Status (LCIS):                     All margins negative for LCIS                                                          LCIS present 4 mm from nearest anterior inferior inked margin  Lymph-Vascular Invasion:            Not identified  Regional Lymph Nodes Status:    Regional lymph nodes negative for metastatic tumor (0/1)  Total # of Nodes Examined:         1  # of [place] Nodes Examined:1  Treatment Effect:                           No known pre-surgical treatment  Additional Findings:                      Features of prior biopsy site with biopsy marker identified  Ancillary Studies:                           Performed prior outside biopsy([facility]                                                          ER and IA positive (%); HER2 negative (0); Ki-67 15%     Pathologic Tumor Stage:           pT1c  pN0  (sn)     BWW/bw   Electronically signed by Abraham Bartholomew,       INTERVAL HISTORY:   Nakita Vaughn comes in for a post-op check.  She denies fever, chills, chest pain or shortness of breath.  Her pain is well controlled.      MEDICATIONS:     Current Outpatient Medications   Medication Sig Dispense Refill    albuterol (PROVENTIL/VENTOLIN HFA) 90  mcg/actuation inhaler Inhale 2 puffs into the lungs every 6 (six) hours as needed for Wheezing. Rescue 18 g 0    diazePAM (VALIUM) 5 MG tablet Take 1 tablet (5 mg total) by mouth every 6 (six) hours as needed for Anxiety. 2 tablet 0     No current facility-administered medications for this visit.       ALLERGIES:   Review of patient's allergies indicates:  No Known Allergies    PHYSICAL EXAMINATION:   General:  This is a well appearing female with appropriate speech, affect and gait.     Breast:  bilateral breast incision clean, dry, and intact, edema to bilateral flaps, flap viable with good cap refill.    ASSESSMENT:   The patient has had an uneventful postoperative course.    PLAN:   1. Return in 3 months for a follow up office visit and breast exam  2. The patient is advised in continued exam of the breast chest wall and to report to this office sooner should she note any areas of abnormality or concern.   3.  She has been instructed to meet with med onc for discussion of adjuvant treatment recommendations  4. A copy of her pathology was provided today.     Negrita Metzger M.D.

## 2024-06-26 ENCOUNTER — TELEPHONE (OUTPATIENT)
Dept: DERMATOLOGY | Facility: CLINIC | Age: 44
End: 2024-06-26
Payer: COMMERCIAL

## 2024-06-26 ENCOUNTER — DOCUMENTATION ONLY (OUTPATIENT)
Dept: SURGERY | Facility: CLINIC | Age: 44
End: 2024-06-26
Payer: COMMERCIAL

## 2024-06-26 DIAGNOSIS — C50.311 MALIGNANT NEOPLASM OF LOWER-INNER QUADRANT OF RIGHT BREAST OF FEMALE, ESTROGEN RECEPTOR POSITIVE: Primary | ICD-10-CM

## 2024-06-26 DIAGNOSIS — Z17.0 MALIGNANT NEOPLASM OF LOWER-INNER QUADRANT OF RIGHT BREAST OF FEMALE, ESTROGEN RECEPTOR POSITIVE: Primary | ICD-10-CM

## 2024-06-26 NOTE — NURSING
"Met with patient during post op visit 24, states she is doing well. Rafaeld Carlos MANN about Oncotype progress. Pt aware that navigator will reach out to her when results are back to set up a consultation with Dr. Okeefe to discuss formal recommendations. Pt has follow up scheduled with Dr. Pastrana and scheduled to come back to see Dr. Metzger. Shelley PARDO working with patient for OT.     Oncology Navigation   Intake  Date of Diagnosis: 24  Cancer Type: Breast  Type of Referral: Internal  Date of Referral: 24  Initial Nurse Navigator Contact: 24  Referral to Initial Contact Timeline (days): 0  Date Worked: 04/15/24  First Appointment Available: 24  Appointment Date: 24  First Available Date vs. Scheduled Date (days): 0  Multiple appointments: Yes (Dr. Okeefe)     Treatment  Current Status: Active  Date Presented to Tumor Board: 24    Surgery: Planned  Surgical Oncologist: Negrita Metzger MD  Plastic Surgeon: Gadiel Pastrana MD  Type of Surgery: Bilateral nipple sparing mastectomies with right sentinel lymph node biopsy with immediate CAROLYN flap  Consult Date: 04/15/24  Surgery Schedule Date: 24    Medical Oncologist: Yakov Okeefe MD  Consult Date: 24       Procedures: MRI; Genetic test  Genetic Testing Date Sent: 24  MRI Schedule Date: 24    General Referrals: Physical Therapy    ER: Positive  MS: Positive  Her2: Negative       Support Systems: Spouse/significant other; Family members; Children  Barriers of Care: Barriers to Care "Assessment completed-no barriers noted"     Acuity  Stage: 1  Surgical Procedure Complexity: 2  Treatment Tolerability: Has not started treatment yet/treatment fully completed and side effects resolved  ECO  Comorbidities in Medical History: 1  Hospitalization Within the Past Month: 0   Needed: 0  Support: 0  Verbalizes Financial Concerns: 0  Transportation: 0  Psychological Factors (+1 each): Emotional during " conversation  History of noncompliance/frequent no shows and cancellations: 0  Verbalizes the need for more education: 1  Other Factors (+1 for Each): 0  Navigation Acuity: 6     Follow Up  No follow-ups on file.

## 2024-06-28 ENCOUNTER — TELEPHONE (OUTPATIENT)
Dept: HEMATOLOGY/ONCOLOGY | Facility: CLINIC | Age: 44
End: 2024-06-28
Payer: COMMERCIAL

## 2024-07-01 ENCOUNTER — OFFICE VISIT (OUTPATIENT)
Dept: HEMATOLOGY/ONCOLOGY | Facility: CLINIC | Age: 44
End: 2024-07-01
Payer: COMMERCIAL

## 2024-07-01 DIAGNOSIS — Z80.41 FAMILY HISTORY OF OVARIAN CANCER: ICD-10-CM

## 2024-07-01 DIAGNOSIS — Z15.09 MONOALLELIC MUTATION OF MITF GENE: ICD-10-CM

## 2024-07-01 DIAGNOSIS — Z15.89 MONOALLELIC MUTATION OF MITF GENE: ICD-10-CM

## 2024-07-01 DIAGNOSIS — Z17.0 MALIGNANT NEOPLASM OF LOWER-INNER QUADRANT OF RIGHT BREAST OF FEMALE, ESTROGEN RECEPTOR POSITIVE: Primary | ICD-10-CM

## 2024-07-01 DIAGNOSIS — C50.311 MALIGNANT NEOPLASM OF LOWER-INNER QUADRANT OF RIGHT BREAST OF FEMALE, ESTROGEN RECEPTOR POSITIVE: Primary | ICD-10-CM

## 2024-07-01 NOTE — LETTER
July 2, 2024    Nakita Vaughn  [address] [facility] [address]             [facility]  [address] [place] [id number]  Phone: [phone]  Fax: [phone] Dear Nakita,    It was a pleasure to meeting you on July 1, 2024 for your virtual  genetic counseling appointment through the [facility]. We reviewed your medical history, family history and the implications of the MITF mutation found in you. Below is a summary of our discussion.    Cancer Genetics    Before we discussed your results, we briefly reviewed some information about genetics. DNA is our genetic code, as it provides instructions for our bodies on how to grow and function. Our DNA is organized into chunks called genes, and each gene has an important role in our bodies. Some genes determine our physical traits, such as height and eye color, but other genes are supposed to protect our bodies from developing cancer. When an individual is born with a damaging mutation (pathogenic variant) in one of these genes, it is important to identify them so that increased screening recommendations and/or preventative options can be made.    Genetic Test Results    You had a sample submitted to [facility] on 4/18/24 for Multi Cancer Panel testing. 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 Some studies have shown an increased risk of renal cell carcinoma in individuals with a MITF mutation, but this has not be reproduced in other studies.    Management Recommendations    There are currently no NCCN Guidelines for screening of individuals with an MITF mutation, but Rich et al (2017) that individuals with a MITF mutation undergo the following skin cancer screenings and prevention:  Whole-body skin exam every 3-12 months with a dermatologist, supplemented with longitudinal photography and digital dermoscopy  Monthly self-skin exams  Skin protection (wearing sun-protective clothing, hats, sunglasses and sunscreen) and avoiding tanning beds and sunburns    Family Members and Inheritance    MITF-Cancer Risk is inherited in an autosomal dominant pattern, meaning each of your first degree relatives (children, parents) have a 50% chance to have also inherited the same MITF mutation found in you. However, it is difficult to determine which parent you inherited the MITF mutation from, as there is no suspicious history of cancer on either side of the family. Therefore, it is recommended for your mother to undergo predictive testing for this MITF mutation. If she tests positive, then other maternal relatives can undergo predictive testing as well.  Your children can also undergo testing at an appropriate age. Any relatives who are interested in undergoing familial mutation testing should bring a copy of your genetic test report to their appointment with their provider. Please send them a copy of the family letter I sent to you, as well.    Your relatives could have a genetic mutation you did not inherit and may be at increased risk for cancer based on their own personal and family history. Therefore, they should meet with a genetics provider to determine if they should have testing for  mutations in genes other than MITF.  Additionally, heterozygous mutations in MITF are not known to cause pediatric cancers, therefore, testing is not recommended until at least 18 years-old.    Anyone interested in pursuing genetic counseling/testing can contact the [facility] for an in-personal or virtual appointment in [place] [phone] or [place] [phone]. Virtual patients must be located in [place] and able to access the virtual visit through the MyChart ([facility]) portal. Anyone who is located outside of [place] or prefers to see someone in-person closer to home can visit [email or web address] to locate a local genetic counselor.    Follow-up    Cancer genetics is a rapidly evolving field, and there may be additional information available in the future that could be beneficial to you or your family. Please follow-up with me every 2 years to discuss updates to management recommendations. In the meantime, please update me through [facility] with any changes to your personal or family history and with any relative's genetic testing results. I'm happy to review their results to determine how they might affect you and other family members.     Sincerely,      Nena Harris, [facility] [place] [place], [facility]  Senior Genetic Counselor, Hereditary/High-Risk Clinic  [facility]    Phone:  [phone]

## 2024-07-01 NOTE — LETTER
[facility]  [address] [place] [address]  Phone [phone]  Fax [phone]    Dear relative of Nakita Vaughn,    Nakita recently had genetic testing that revealed a mutation in her MITF gene, which is associated with an increased risk for cutaneous melanoma. Since mutations are passed directly from parent to child, with each child having a 50% chance of inheriting the mutation, this MITF mutation could be present in Nakita's siblings, children, aunt/uncles, cousins, etc. Genetic counseling and testing is recommended to determine if you or other blood relatives of Nakita have this mutation. The genetics provider will determine if you only need testing for this MITF mutation, or if you should also be tested for mutations in other genes. If you only need testing for this MITF mutation, [facility] will test your MITF gene for free if your specimen is received within 150 days of Nakita's results date. Please give this letter to your genetics provider.    PROBAND Nakita Vaughn      1980   LAB [facility]   TEST Multi Cancer Panel   REQUISITION # [id number]   RESULT DATE 2024           (+150 days = 2024)   RESULT MITF+ (c.952G<A), heterozygous       Genetic counseling appointments:   Anyone interested in pursuing genetic counseling/testing can contact the [facility] to schedule an appointment with a genetics provider by calling [phone]. In-person visits are available in [facility], and [place]. Virtual visits are available for individuals located in [place]. Virtual patients must be able to access the virtual visit through the MyChart ([facility]) portal. Anyone who is unable to see an [facility] provider or prefers an in-person visit with someone closer to home can find a genetic counselor in their area by visiting the website for the National Society of Genetic Counselors (www.[email or web address]) and clicking Find  a Genetic Counselor.       Sincerely,      Nena Harris, [facility] [place], [facility]  Senior Genetic Counselor  [facility]

## 2024-07-01 NOTE — PROGRESS NOTES
Cancer Genetics  Hereditary and High-Risk Clinic  Department of Hematology and Oncology  [facility]    [facility]    Date of Service:  24  Visit Provider:  Nena Harris, [facility] [place], [facility]    Patient ID  Name: Nakita Vaughn    : 1980    MRN: [id number]      Referring Provider  Destiny Benavides NP  [address] [place] [address]    Televisit Information  The patient location is: [place], [place].    The chief complaint leading to consultation is:  As below.    Face-to-face time with patient:  Approximately 30 minutes.    Approximately 120 minutes in total were spent on this encounter, which includes face-to-face time and non-face-to-face time preparing to see the patient (e.g., review of records and tests), obtaining and/or reviewing separately obtained history, documenting clinical information in the electronic or other health record, independently interpreting results (not separately reported) and communicating results to the patient/family/caregiver, or care coordination (not separately reported).  Each patient to whom he or she provides medical services by telemedicine is:  (1) informed of the relationship between the physician and patient and the respective role of any other health care provider with respect to management of the patient; and (2) notified that he or she may decline to receive medical services by telemedicine and may withdraw from such care at any time.    ANTOINE Mace is a 44yo with a recent diagnosis of breast cancer who was referred for genetic counseling after her genetic test results were positive for a pathogenic MITF mutation (c.952G>A). We discussed that this mutation does not explain her early-onset breast cancer or family history of ovarian cancer, and it does not impact her breast cancer treatment. It is associated with a 2-5 fold increased risk of cutaneous melanoma, and Nakita was already referred to Dermatology. She has been sent a  "family letter and genetic counseling letter, and Nakita will follow-up with me every 2 years to discuss management updates.    FOCUSED PERSONAL HISTORY     Chief Complaint: Genetic Evaluation (Monoallelic MITF Mutation)    History of Present Illness (HPI):  Nakita Vaughn ("Nakita"), 43 y.o., assigned female sex at birth, is established with the [facility] but new to me.  She was referred by Destiny Benavides with Breast Surgery in [place] for hereditary cancer risk assessment given her MITF+ (c.952G>A) genetic test results. She was diagnosed with ER+/CO+/HER2- invasive lobular carcinoma of her right breast after biopsy on 24 at 42yo. She underwent bilateral nipple sparing left mastectomy with right SLNB at [facility] on 6/3/24, which identified pT1c pN0 (sn) disease. OncotypeDx testing is still pending, which will determine adjuvant treatment recommendations.    Nakita also reported that she had a mole removed from face in 2023 at [facility], but these records were not available for review at the time of her appointment. She has already been referred to [facility], and stated that she will call their clinic back in ~1 month to schedule an appointment, after she has healed from her breast surgery.    Focused Social History  Tobacco Use: Medium Risk (6/3/2024)    Received from [facility]    Patient History     Smoking Tobacco Use: Former     Smokeless Tobacco Use: Never     Passive Exposure: Not on file      FAMILY HISTORY         Nakita reported her family history of cancer as follows:  Maternal grandmother: diagnosed with ovarian cancer at 62yo and  at 64yo  Maternal aunt: 54yo who was diagnosed with cancer, but the age and primary site could not be recalled    A family history of birth defects, intellectual disability, SIDS, sudden early death, multiple miscarriages and consanguinity were denied. Please refer to above pedigree for further " details. A larger copy is available for review into the Media tab.    DISCUSSION     GENETIC TEST RESULTS    Nakita had a sample submitted to [facility] on 4/18/24 for Multi Cancer Panel testing. This panel includes sequencing and rearrangement testing for the following 70 genes known to be associated with hereditary breast, gastrointestinal, gynecologic, kidney, pancreatic, prostate, skin and other cancers:     AIP, ALK, APC, ODESSA, AXIN2, BAP1, BARD1, BLM, BMPR1A, BRCA1, BRCA2, BRIP1, CDC73, CDH1, CDK4, CDKN1B, CDKN2A, CHEK2, CTNNA1, DICER1, EGFR, EPCAM, FH, FLCN, GREM1, HOXB13, KIT, LZTR1, MAX, MBD4, MEN1, MET, MITF, MLH1, MSH2, MSH3, MSH6, MUTYH, NF1, NF2, NTHL1, PALB2, PDGFRA, PMS2, POLD1, POLE, POT1, TWMJC2E, PTCH1, PTEN, RAD51C, RAD51D, RB1, RET, SDHA, SDHAf2, SDHB, SDHC, SDHD, SMAD4, SMARCA4, SMARCB1, SMARCE1, STK11, SUFU, ONBW864, TP53, TSC1, TSC2, VHL    The results of this testing revealed a heterozygous pathogenic mutation in MITF. This particular mutation is described as c.952G>A, which results in an amino acid substitution that impact protein function. This is considered a positive result.        CANCER RISK    Individuals with a MITF mutation have a 2-5 fold increased risk of cutaneous melanoma, depending on other risk factors such as family history, geographic locations and ancestry. Some studies have shown an increased risk of renal cell carcinoma in individuals with a MITF mutation, but this has not be reproduced in other studies.    MANAGEMENT RECOMMENDATIONS    There are currently no NCCN Guidelines for screening of individuals with an MITF mutation, but Rich et al (2017) that individuals with a MITF mutation undergo the following skin cancer screenings and prevention:  Whole-body skin exam every 3-12 months with a dermatologist, supplemented with longitudinal photography and digital dermoscopy  Monthly self-skin exams  Skin protection (wearing sun-protective clothing, hats, sunglasses and  sunscreen) and avoiding tanning beds and sunburns    FAMILY MEMBERS AND INHERITANCE    We discussed how MITF mutations are passed through the family. Nakita's children have a 50% chance to have inherited the same MITF mutation identified in her. We reviewed that MITF mutations are typically passed down for generations, therefore Nakita likely inherited this from one of her parents.  However, it is difficult to determine where the MITF was inherited from given the lack of melanoma in the family. As Nakita is not in contact with her biological father, it is recommended to offer predictive testing to her mother first. If she tests positive, then other maternal relatives could pursue predictive testing as well. [facility] will perform this testing complimentary until 9/23/24.    We discussed that MITF mutations are not associated with an increased risk of pediatric cancers and therefore predictive testing is not recommended until age 18.      Nakita received comprehensive counseling regarding her MITF+ genetic test results. Benefits and limitations of genetic testing were discussed. She was previously provided with a copy of her genetic test results, and has been sent a family letter and genetic counseling letter. Nakita will following up with me every 2 years, and in the meantime, she is encouraged to contact me if she has any questions or concerns.     ASSESSMENT / PLAN      Post-test genetic counseling was provided for Nakita given her MITF+ (c.952G>A) genetic test results. This mutation is an incidental finding that is unrelated to her family history and breast cancer diagnosis, and it does not impact her treatment.  Given the 2-5 fold increased risk of cutaneous melanoma with this mutation, and that she is fair-skinned, we discussed that the following screening is indicated for her:    -whole body skin exam with a dermatologist every 3-12 months, supplemented with photography and dermoscopy (referral to Dermatology already  placed)  -skin protection (protective clothing, hats, sunglasses, sunscreen)    Nakita was provided with a genetic counseling letter reviewing our discussion, along with a family letter for relatives interested in pursuing testing for this mutation.      ICD-10-CM ICD-9-CM   1. Malignant neoplasm of lower-inner quadrant of right breast of female, estrogen receptor positive  C50.311 174.3    Z17.0 V86.0   2. Monoallelic mutation of MITF gene  Z15.89 V84.89    Z15.09    3. Family history of ovarian cancer  Z80.41 V16.41     1. Monoallelic mutation of MITF gene  - Ambulatory referral/consult to Genetics    2. Malignant neoplasm of lower-inner quadrant of right breast of female, estrogen receptor positive    3. Family history of ovarian cancer         Follow-up:  Follow up in about 2 years (around 7/1/2026) for updates regarding MITF management.      Approximately 30 minutes were spent face-to-face with the patient.  Approximately 120 minutes in total were spent on this encounter, which includes face-to-face time and non-face-to-face time preparing to see the patient (e.g., review of tests), obtaining and/or reviewing separately obtained history, documenting clinical information in the electronic or other health record, independently interpreting results (not separately reported) and communicating results to the patient/family/caregiver, or care coordination (not separately reported).     This assessment is based on the history and reports provided, as well as the current scientific knowledge regarding cancer genetics.         Nena Harris, [facility], [facility]  Senior Genetic Counselor, Hereditary and High-Risk Clinic  Department of Hematology and Oncology  [facility]        CC:  Destiny Benavides

## 2024-07-05 ENCOUNTER — TELEPHONE (OUTPATIENT)
Dept: HEMATOLOGY/ONCOLOGY | Facility: CLINIC | Age: 44
End: 2024-07-05
Payer: COMMERCIAL

## 2024-07-05 ENCOUNTER — PATIENT MESSAGE (OUTPATIENT)
Dept: SURGERY | Facility: CLINIC | Age: 44
End: 2024-07-05
Payer: COMMERCIAL

## 2024-07-05 NOTE — TELEPHONE ENCOUNTER
Called and updated pt with information regarding oncotype dx testing. Pt verbalized understanding of timeline. No other questions or concerns at this time.

## 2024-07-10 ENCOUNTER — TELEPHONE (OUTPATIENT)
Dept: HEMATOLOGY/ONCOLOGY | Facility: CLINIC | Age: 44
End: 2024-07-10
Payer: COMMERCIAL

## 2024-07-10 ENCOUNTER — PATIENT MESSAGE (OUTPATIENT)
Dept: HEMATOLOGY/ONCOLOGY | Facility: CLINIC | Age: 44
End: 2024-07-10
Payer: COMMERCIAL

## 2024-07-10 NOTE — TELEPHONE ENCOUNTER
Returned pt's call. Pt wanted to make sure that the lab had received her tissue sample. I confirmed for her that I saw this morning that it was received and there is forward movement. I let her know that I would get her in to see Dr Okeefe as soon as I had more information on the Oncotype. Pt verbalized understanding. No other questions or concerns at this time.

## 2024-07-16 ENCOUNTER — TELEPHONE (OUTPATIENT)
Dept: HEMATOLOGY/ONCOLOGY | Facility: CLINIC | Age: 44
End: 2024-07-16
Payer: COMMERCIAL

## 2024-07-16 NOTE — TELEPHONE ENCOUNTER
----- Message from Yakov Okeefe MD sent at 7/16/2024  7:23 AM CDT -----  Regarding: RE: Oncotype back  Thank you very much please let her know that the Oncotype recurrence score for indicates that she will not need systemic chemotherapy we will discuss follow-up later this week  ----- Message -----  From: Carlos Forrest RN  Sent: 7/16/2024   7:22 AM CDT  To: Yakov Okeefe MD; [facility] Staff  Subject: Oncotype back                                    Loaded report in media and scheduled with her an appt for this morning @ 5076   Thanks  C

## 2024-07-16 NOTE — TELEPHONE ENCOUNTER
Called to let pt know she Oncotype test had returned and to offer appt this morning with Dr Okeefe at 1120 at the [facility]. Pt agreed and verbalized understanding of place, time and date of appt. No other questions or concerns at this time. Call ended well.

## 2024-07-17 ENCOUNTER — OFFICE VISIT (OUTPATIENT)
Dept: HEMATOLOGY/ONCOLOGY | Facility: CLINIC | Age: 44
End: 2024-07-17
Payer: COMMERCIAL

## 2024-07-17 VITALS
OXYGEN SATURATION: 99 % | TEMPERATURE: 98 F | RESPIRATION RATE: 20 BRPM | SYSTOLIC BLOOD PRESSURE: 129 MMHG | DIASTOLIC BLOOD PRESSURE: 83 MMHG | HEART RATE: 97 BPM | BODY MASS INDEX: 34.62 KG/M2 | HEIGHT: 62 IN

## 2024-07-17 DIAGNOSIS — Z17.0 MALIGNANT NEOPLASM OF LOWER-INNER QUADRANT OF RIGHT BREAST OF FEMALE, ESTROGEN RECEPTOR POSITIVE: Primary | ICD-10-CM

## 2024-07-17 DIAGNOSIS — Z15.89 MONOALLELIC MUTATION OF MITF GENE: ICD-10-CM

## 2024-07-17 DIAGNOSIS — Z15.09 MONOALLELIC MUTATION OF MITF GENE: ICD-10-CM

## 2024-07-17 DIAGNOSIS — Z80.41 FAMILY HISTORY OF OVARIAN CANCER: ICD-10-CM

## 2024-07-17 DIAGNOSIS — C50.311 MALIGNANT NEOPLASM OF LOWER-INNER QUADRANT OF RIGHT BREAST OF FEMALE, ESTROGEN RECEPTOR POSITIVE: Primary | ICD-10-CM

## 2024-07-17 PROCEDURE — 99999 PR PBB SHADOW E&M-EST. PATIENT-LVL IV: CPT | Mod: PBBFAC,,, | Performed by: INTERNAL MEDICINE

## 2024-07-17 PROCEDURE — 3079F DIAST BP 80-89 MM HG: CPT | Mod: CPTII,S$GLB,, | Performed by: INTERNAL MEDICINE

## 2024-07-17 PROCEDURE — 3008F BODY MASS INDEX DOCD: CPT | Mod: CPTII,S$GLB,, | Performed by: INTERNAL MEDICINE

## 2024-07-17 PROCEDURE — 1159F MED LIST DOCD IN RCRD: CPT | Mod: CPTII,S$GLB,, | Performed by: INTERNAL MEDICINE

## 2024-07-17 PROCEDURE — 3074F SYST BP LT 130 MM HG: CPT | Mod: CPTII,S$GLB,, | Performed by: INTERNAL MEDICINE

## 2024-07-17 PROCEDURE — 99214 OFFICE O/P EST MOD 30 MIN: CPT | Mod: S$GLB,,, | Performed by: INTERNAL MEDICINE

## 2024-07-17 PROCEDURE — 1160F RVW MEDS BY RX/DR IN RCRD: CPT | Mod: CPTII,S$GLB,, | Performed by: INTERNAL MEDICINE

## 2024-07-17 RX ORDER — ALBUTEROL SULFATE 90 UG/1
2 AEROSOL, METERED RESPIRATORY (INHALATION) EVERY 4 HOURS PRN
COMMUNITY

## 2024-07-17 RX ORDER — OXYCODONE AND ACETAMINOPHEN 10; 325 MG/1; MG/1
1 TABLET ORAL
COMMUNITY
Start: 2024-05-30

## 2024-07-17 RX ORDER — TAMOXIFEN CITRATE 20 MG/1
20 TABLET ORAL DAILY
Qty: 90 TABLET | Refills: 3 | Status: SHIPPED | OUTPATIENT
Start: 2024-07-17 | End: 2025-07-17

## 2024-07-17 RX ORDER — ONDANSETRON 4 MG/1
4 TABLET, ORALLY DISINTEGRATING ORAL EVERY 8 HOURS PRN
COMMUNITY
Start: 2024-05-30

## 2024-07-17 RX ORDER — GABAPENTIN 100 MG/1
100 CAPSULE ORAL
COMMUNITY
Start: 2024-06-21

## 2024-07-17 RX ORDER — CEPHALEXIN 500 MG/1
500 CAPSULE ORAL 3 TIMES DAILY
COMMUNITY
Start: 2024-05-30

## 2024-07-17 RX ORDER — CYCLOBENZAPRINE HCL 10 MG
10 TABLET ORAL EVERY 8 HOURS PRN
COMMUNITY
Start: 2024-05-30

## 2024-07-17 NOTE — PLAN OF CARE
START ON PATHWAY REGIMEN - Breast    [id number]        Tamoxifen     **Always confirm dose/schedule in your pharmacy ordering system**    Patient Characteristics:  Postoperative without Neoadjuvant Therapy, M0 (Pathologic Staging), Invasive   Disease, Adjuvant Therapy, HER2 Negative, ER Positive, Node Negative, pT1a,   pN1mi or pT1b-c, pN0/N1mi or pT2 or Higher, pN0, Oncotype Low Risk (? 15),   Premenopausal  Therapeutic Status: Postoperative without Neoadjuvant Therapy, M0 (Pathologic   Staging)  AJCC Grade: G2  AJCC N Category: pN0  AJCC M Category: cM0  ER Status: Positive (+)  AJCC 8 Stage Grouping: IA  HER2 Status: Negative (-)  Oncotype Dx Recurrence Score: 4  AJCC T Category: pT2  [place] Status: Positive (+)  Has this patient completed genomic testing<= Yes - Oncotype DX(R)  Menopausal Status: Premenopausal  Intent of Therapy:  Non-Curative / Palliative Intent, Discussed with Patient

## 2024-07-17 NOTE — PROGRESS NOTES
Subjective:       Patient ID: Nakita Vaughn is a 43 y.o. female.    Chief Complaint: Results and Breast Cancer    HPI:  43-year-old female history of bilateral nipple sparing mastectomies Oncotype recurrence score 4.  Patient returns for review    Past Medical History:   Diagnosis Date    Malignant neoplasm of lower-inner quadrant of right breast of female, estrogen receptor positive 04/14/2024    Monoallelic mutation of MITF gene     Shortness of breath 04/30/2021    Snoring 04/30/2021     Family History   Problem Relation Name Age of Onset    Diabetes Mother      Hypertension Mother      COPD Mother      Ovarian cancer Maternal Grandmother  63    Cancer Maternal Aunt      Breast cancer Neg Hx      Colon cancer Neg Hx       Social History     Socioeconomic History    Marital status:    Tobacco Use    Smoking status: Never    Smokeless tobacco: Never   Substance and Sexual Activity    Alcohol use: No    Drug use: No    Sexual activity: Yes     Partners: Male     Birth control/protection: Other-see comments     Comment: Tubal ligation     Social Determinants of Health     Financial Resource Strain: Low Risk  (7/1/2024)    Overall Financial Resource Strain (CARDIA)     Difficulty of Paying Living Expenses: Not hard at all   Food Insecurity: No Food Insecurity (7/1/2024)    Hunger Vital Sign     Worried About Running Out of Food in the Last Year: Never true     Ran Out of Food in the Last Year: Never true   Transportation Needs: No Transportation Needs (5/22/2024)    Received from [facility] Transportation     Lack of Transportation (Medical): No     Lack of Transportation (Non-Medical): No   Physical Activity: Insufficiently Active (7/1/2024)    Exercise Vital Sign     Days of Exercise per Week: 2 days     Minutes of Exercise per Session: 20 min   Stress: Stress Concern Present (7/1/2024)    [employer] [place] [employer] - Occupational Stress  "Questionnaire     Feeling of Stress : To some extent   Housing Stability: Unknown (2024)    Housing Stability Vital Sign     Unable to Pay for Housing in the Last Year: No     Past Surgical History:   Procedure Laterality Date     SECTION      x2     SECTION WITH TUBAL LIGATION         Labs:  Lab Results   Component Value Date    WBC 5.93 2024    HGB 12.3 2024    HCT 37.4 2024    MCV 85 2024     2024     BMP  Lab Results   Component Value Date     2024    K 4.0 2024     2024    CO2 25 2024    BUN 15 2024    CREATININE 0.7 2024    CALCIUM 9.5 2024    ANIONGAP 5 (L) 2024    ESTGFRAFRICA >60.0 2021    EGFRNONAA >60.0 2021     Lab Results   Component Value Date    ALT 14 10/06/2023    AST 14 10/06/2023    ALKPHOS 53 (L) 10/06/2023    BILITOT 0.9 10/06/2023       No results found for: "IRON", "TIBC", "FERRITIN", "SATURATEDIRO"  No results found for: "QOXXGDIY16"  No results found for: "FOLATE"  Lab Results   Component Value Date    TSH 1.116 10/06/2023         Review of Systems   Constitutional:  Negative for activity change, appetite change, chills, diaphoresis, fatigue, fever and unexpected weight change.   HENT:  Negative for congestion, dental problem, drooling, ear discharge, ear pain, facial swelling, hearing loss, mouth sores, nosebleeds, postnasal drip, rhinorrhea, sinus pressure, sneezing, sore throat, tinnitus, trouble swallowing and voice change.    Eyes:  Negative for photophobia, pain, discharge, redness, itching and visual disturbance.   Respiratory:  Negative for cough, choking, chest tightness, shortness of breath, wheezing and stridor.    Cardiovascular:  Negative for chest pain, palpitations and leg swelling.   Gastrointestinal:  Negative for abdominal distention, abdominal pain, anal bleeding, blood in stool, constipation, diarrhea, nausea, rectal pain and vomiting. "   Endocrine: Negative for cold intolerance, heat intolerance, polydipsia, polyphagia and polyuria.   Genitourinary:  Negative for decreased urine volume, difficulty urinating, dyspareunia, dysuria, enuresis, flank pain, frequency, genital sores, hematuria, menstrual problem, pelvic pain, urgency, vaginal bleeding, vaginal discharge and vaginal pain.   Musculoskeletal:  Negative for arthralgias, back pain, gait problem, joint swelling, myalgias, neck pain and neck stiffness.   Skin:  Negative for color change, pallor and rash.   Allergic/Immunologic: Negative for environmental allergies, food allergies and immunocompromised state.   Neurological:  Negative for dizziness, tremors, seizures, syncope, facial asymmetry, speech difficulty, weakness, light-headedness, numbness and headaches.   Hematological:  Negative for adenopathy. Does not bruise/bleed easily.   Psychiatric/Behavioral:  Negative for agitation, behavioral problems, confusion, decreased concentration, dysphoric mood, hallucinations, self-injury, sleep disturbance and suicidal ideas. The patient is not nervous/anxious and is not hyperactive.        Objective:      Physical Exam  Vitals reviewed.   Constitutional:       General: She is not in acute distress.     Appearance: She is well-developed. She is not diaphoretic.   HENT:      Head: Normocephalic and atraumatic.      Right Ear: External ear normal.      Left Ear: External ear normal.      Nose: Nose normal.      Right Sinus: No maxillary sinus tenderness or frontal sinus tenderness.      Left Sinus: No maxillary sinus tenderness or frontal sinus tenderness.      Mouth/Throat:      Pharynx: No oropharyngeal exudate.   Eyes:      General: Lids are normal. No scleral icterus.        Right eye: No discharge.         Left eye: No discharge.      Conjunctiva/sclera: Conjunctivae normal.      Right eye: Right conjunctiva is not injected. No hemorrhage.     Left eye: Left conjunctiva is not injected. No  hemorrhage.     Pupils: Pupils are equal, round, and reactive to light.   Neck:      Thyroid: No thyromegaly.      Vascular: No JVD.      Trachea: No tracheal deviation.   Cardiovascular:      Rate and Rhythm: Normal rate.   Pulmonary:      Effort: Pulmonary effort is normal. No respiratory distress.      Breath sounds: No stridor.   Chest:      Chest wall: No tenderness.   Abdominal:      General: Bowel sounds are normal. There is no distension.      Palpations: Abdomen is soft. There is no hepatomegaly, splenomegaly or mass.      Tenderness: There is no abdominal tenderness. There is no rebound.   Musculoskeletal:         General: No tenderness. Normal range of motion.      Cervical back: Normal range of motion and neck supple.   Lymphadenopathy:      Cervical: No cervical adenopathy.      Upper Body:      Right upper body: No supraclavicular adenopathy.      Left upper body: No supraclavicular adenopathy.   Skin:     General: Skin is dry.      Findings: No erythema or rash.   Neurological:      Mental Status: She is alert and oriented to person, place, and time.      Cranial Nerves: No cranial nerve deficit.      Coordination: Coordination normal.   Psychiatric:         Behavior: Behavior normal.         Thought Content: Thought content normal.         Judgment: Judgment normal.           Assessment:      1. Malignant neoplasm of lower-inner quadrant of right breast of female, estrogen receptor positive    2. Monoallelic mutation of MITF gene    3. Family history of ovarian cancer           Med Onc Chart Routing      Follow up with physician 3 months. [facility] location   Follow up with LUCINA    Infusion scheduling note    Injection scheduling note    Labs    Imaging    Pharmacy appointment    Other referrals         Needs referral back to OB gyn          Plan:     History of bilateral nipple sparing mastectomies Oncotype recurrence score for case placed through via [employer] [facility] concur tamoxifen 20 mg a  day for minimum of 5 years.  .  Discussed implications with her.  Continues to be followed by Dermatology for increased risk of melanoma; discussed slight increased risk with tamoxifen use.  Of uterine cancer would recommend careful follow-up through OB gyn.  Also sent her an article from up-to-date on treatment of premenopausal women with breast carcinoma adjuvant endocrine therapy low risk systemic recurrence.        Yakov Okeefe Jr, MD FACP

## 2024-07-25 ENCOUNTER — CLINICAL SUPPORT (OUTPATIENT)
Dept: REHABILITATION | Facility: HOSPITAL | Age: 44
End: 2024-07-25
Attending: SURGERY
Payer: COMMERCIAL

## 2024-07-25 DIAGNOSIS — R52 PAIN: ICD-10-CM

## 2024-07-25 DIAGNOSIS — Z91.89 AT RISK FOR LYMPHEDEMA: Primary | ICD-10-CM

## 2024-07-25 DIAGNOSIS — R53.1 DECREASED STRENGTH: ICD-10-CM

## 2024-07-25 DIAGNOSIS — Z91.89 AT RISK FOR SELF CARE DEFICIT: ICD-10-CM

## 2024-07-25 DIAGNOSIS — Z78.9 DECREASED ACTIVITIES OF DAILY LIVING (ADL): ICD-10-CM

## 2024-07-25 DIAGNOSIS — M25.60 DECREASED RANGE OF MOTION: ICD-10-CM

## 2024-07-25 DIAGNOSIS — Z71.9 ENCOUNTER FOR EDUCATION: ICD-10-CM

## 2024-07-25 PROCEDURE — 97140 MANUAL THERAPY 1/> REGIONS: CPT

## 2024-07-26 ENCOUNTER — TELEPHONE (OUTPATIENT)
Dept: OBSTETRICS AND GYNECOLOGY | Facility: CLINIC | Age: 44
End: 2024-07-26
Payer: COMMERCIAL

## 2024-07-26 NOTE — TELEPHONE ENCOUNTER
Called pt regarding appt on 8/1 for annual exam confirmed pt identifiers informed pt provider will not be in clinic that day annual was not due until October pt stated oncologist recommended she have it done sooner than that date informed insurance may not cover it being done so soon stated she will reach out to them first and will r/s on portal for 10/6 or after I verbalized understanding and cancelled August appt

## 2024-07-26 NOTE — PROGRESS NOTES
[facility]  Occupational Therapy Progress and Treatment Note     Date: 7/25/2024  Name: Nakita Vaughn  Clinic Number: [id number]    Therapy Diagnosis:   Encounter Diagnoses   Name Primary?    At risk for lymphedema Yes    At risk for self care deficit     Encounter for education     Pain     Decreased range of motion     Decreased strength     Decreased activities of daily living (ADL)      Physician: Negrita Metzger MD    Physician Orders: Occupational Therapy to Evaluate and Treat  Medical Diagnosis: C50.311,Z17.0 (ICD-10-CM) - Malignant neoplasm of lower-inner quadrant of right breast of female, estrogen receptor positive      Evaluation Date: 4/25/2024  Updated Plan of Care: 6/18/2024  Insurance Authorization Period Expiration: 5/23/2024 - 12/31/2024  Plan of Care Certification Period: 6/18/2024 - 12/31/2024  Progress Note Due: 8/18/2024      Date of Return to MD: N/A     Visit # / Visits authorized: 2/20 (3 Episode Visits)  FOTO: 1/1     Precautions:  Standard, Limb Restriction     Time In: 2:30  Time Out: 3:15  Total Appointment Time (timed & untimed codes): 45 minutes    Subjective     Patient reports: She is doing well. She is having difficulty raising her left arm due to tightness she feels in her breast. She is also having some breast swelling    she was compliant with home exercise program given last session.   Response to previous treatment: Tolerated well, No adverse effects  Functional change: Improved knowledge of HEP    Pain: 5/10  Location: bilateral breasts, axilla and abdomen     Objective     Range of Motion:     Joint Evaluation  AROM  6/18/2024 AROM  6/18/2024 AROM  7/25/2024 AROM  7/25/2024 Goal     Left Right Left Right     Shoulder Flexion 0-180 110 140 90 160 Within normal limits    Shoulder Abduction 0-180 110 140 90 160    Shoulder External Rotation 0-90 45 45 55 55    Shoulder Internal Rotation 0-90 45 45 55 55    Shoulder Extension 0-60 10 10 20 20    Shoulder  Horizontal Adduction 0-90 45 45 55 Within normal limits           Strength:     Strength 6/18/2024 6/18/2024 7/25/2024 7/25/2024 Goal     Left Right Left Right     Shoulder Flexion 3+/5 3+/5 3+/5 4-/5 5/5    Shoulder Abduction        Shoulder External Rotation         Shoulder Internal Rotation        Shoulder Extension        Shoulder Horizontal Adduction        Elbow Flexion 4-/5 4-/5 4-/5     Elbow Extension             Treatment     Supervised Modalities: Modalities such as hot/cold packs, traction, unattended electrical stimulation, paraffin bath, fluidotherapy to reduce pain and increase soft tissue extensibility. Nakita received (0) minutes of modalities listed below after being cleared for contraindications.  x = modalities performed     Supervised Modalities 7/25/2024                            Manual Therapy Techniques: Joint mobilizations, Soft tissue Mobilization, and mobilization with movement applied to the area listed below. Nakita received (45) minutes of interventions. x = intervention performed today    Manual Intervention 7/25/2024    Extensive Soft Tissue Mobilization/Myofacial Release   - Cervical, Upper trap, Clavicular, Intercostal, Subscapularis, Latissimus dorsi, Rhomboids x   to increase blood flow/circulation, improve soft tissue pliability, decrease pain and decrease sensitivity    Myofacial Release, IASTM, Cupping.      Axillary Web Syndrome release techniques     Manual Lymphatic Drainage x Left breast and axilla   Joint Mobilizations     Mobilization with movement     Scar Management x   to increase blood flow/circulation, improve soft tissue pliability, decrease sensitivity, and reduce risk of hypertropic scarring and adhesions. Scar management performed 3 ways. Patient educated on self scar management with clean hands, on completely healed incisions.          Therapeutic Exercises: to develop strength, endurance, ROM, flexibility, posture and core stabilization. Nakita received (0)  minutes of exercises listed below. x = performed today * = level of progression of activity     Therapeutic Exercise 7/25/2024    Diaphragmatic breathing and relaxation x 5x   Scapular retractions x 3 sets of 10x   Fist making/ball squeezes x 2 minutes   Elbow flexion/extension x 3 sets of 10x   Wall walks   -Flexion  -Abduction x 2 sets of 10x   Pendulums  - front/back  -side to side  -circles both ways x 10x each direction                     Therapeutic Activities: Everyday tasks to address the combined need of improved strength, range of motion, and endurance to achieve increased independence. While simulating these activities, the person is applying the gained skills of strength and improved range of motion in a practical way.  Nakita received (0) minutes of activities listed below. x = activities performed today * = level of progression of activity     Therapeutic Activities 7/25/2024                          Neuromuscular Re-education: the use of functional strengthening, stretching, balancing and coordination activities that train the nerves and muscles to react and communicate to restore normal body movement patterns to increase self care independence. Nakita received (0) minutes of interventions listed below.  x = interventions performed today * = level of progression of activity     Neuromuscular Re-education 7/25/2024                          Self Care: Fundamental skills required to independently care for oneself. Activities of daily living such as eating, bathing, dressing, toileting, grooming, sleeping, transfers and mobility. Instrumental activities of daily living such as driving, medication management, pet care, home management/cleaning, financial management, using the phone, meal preparation and shopping. Nakita received (0) minutes of interventions listed below.  x = interventions performed * = level of progression of activity     Self Care 7/25/2024    Patient was educated in lymphedema etiology  and management plans.   x    Patient was educated on axillary cording and how to identify  x                  Home Exercises and Education Provided     Education provided:   - Importance of self breast massage, and self scar massage   - Action plan for infection  - progress towards goals     Written Home Exercises Provided: yes.  Exercises were reviewed and Nakita was able to demonstrate them prior to the end of the session. Nakita demonstrated good understanding of the home exercise program provided.     See EMR under Patient Instructions for exercises provided 6/18/2024.        Assessment     Patient tolerated manual lymphatic drainage well with reports of improved pain. Patient was educated on self MLD. Therapist contacted Dr Pastrana's office about adhesion of tissue under left breast limiting range of motion. Therapist advised patient to not lift overhead until she can be evaluated at his office. Patient will return to therapy when cleared by plastic surgeon.     Nakita is progressing towards her goals and there are no updates to goals at this time. Patient prognosis is Good.     Patient will continue to benefit from skilled outpatient occupational therapy to address the deficits listed in the problem list on initial evaluation provide patient/family education and to maximize patient's level of independence in the home and community environment.     Patient's spiritual, cultural and educational needs considered and patient agreeable to plan of care and goals.    Anticipated barriers to occupational therapy: Pain    Goals:     Short Term Goals:  6 weeks Progress    Pain: Patient will demonstrate improved pain by reports of less than or equal to 4/10 worst pain on the verbal rating scale in order to progress toward maximal functional ability and improve quality of life. PC   Mobility: Patient will improve active range of motion to 50% of stated goals, listed in objective measures above, in order to progress towards  independence with functional activities.  PC   Strength: Patient will improve strength to 50% of stated goals, listed in objective measures above, in order to progress towards independence with functional activities.  PC   HEP: Patient will demonstrate independence with home exercise program in order to progress toward functional independence. PC   Lymphedema: Patient will demonstrate 100% understanding of lymphedema risk reduction practices to include self monitoring for lymphedema. PC   Massage: Patient will demonstrate 100% understanding of breast and scar massage practices to reduce risk of fibrosis, scar adhesions, range of motion impairments and pain.  PC      Long Term Goals:  End of POC Progress   Pain: Patient will demonstrate improved pain by reports of less than or equal to 1/10 worst pain on the verbal rating scale in order to progress toward maximal functional ability and improve quality of life.   PC   Mobility: Patient will improve active range of motion to stated goals, listed in objective measures above, in order to return to maximal functional potential and improve quality of life. PC   Strength: Patient will improve strength to stated goals, listed in objective measures above, in order to improve functional independence and quality of life. PC   ADL: Patient will return to normal ADL's, IADL's, community involvement, recreational activities, and work-related activities with less than or equal to 0/10 pain, full/maximized tissue mobility and maximal function.  PC      Goals Key:  PC= Progressing/Continue;       PM= Partially Met;       GM= Goal Met,      DC= Discontinue    Plan     Continue Plan of Care (POC) and progress per patient tolerance.      Shelley Leon, OT  ,OTD, OTR/L, CLT

## 2024-08-05 ENCOUNTER — PATIENT MESSAGE (OUTPATIENT)
Dept: SURGERY | Facility: CLINIC | Age: 44
End: 2024-08-05
Payer: COMMERCIAL

## 2024-10-03 ENCOUNTER — TELEPHONE (OUTPATIENT)
Dept: OBSTETRICS AND GYNECOLOGY | Facility: CLINIC | Age: 44
End: 2024-10-03
Payer: COMMERCIAL

## 2024-10-03 NOTE — TELEPHONE ENCOUNTER
Lvm for pt to return call to get appointment scheduled with our department.   Received referral for well woman exam.

## 2024-10-23 ENCOUNTER — TELEPHONE (OUTPATIENT)
Dept: HEMATOLOGY/ONCOLOGY | Facility: CLINIC | Age: 44
End: 2024-10-23
Payer: COMMERCIAL

## 2024-10-23 NOTE — TELEPHONE ENCOUNTER
Contacted pt regarding cancelled appt. Pt states that she is pursuing a possible histi and will let us know when and if she needs to see Dr Okeefe again. Verified that pt had my direct number for future contact. Call ended well.

## 2025-01-27 ENCOUNTER — LAB VISIT (OUTPATIENT)
Dept: LAB | Facility: HOSPITAL | Age: 45
End: 2025-01-27
Attending: PHYSICIAN ASSISTANT
Payer: COMMERCIAL

## 2025-01-27 ENCOUNTER — OFFICE VISIT (OUTPATIENT)
Dept: PRIMARY CARE CLINIC | Facility: CLINIC | Age: 45
End: 2025-01-27
Payer: COMMERCIAL

## 2025-01-27 VITALS
RESPIRATION RATE: 16 BRPM | BODY MASS INDEX: 32.82 KG/M2 | HEIGHT: 62 IN | OXYGEN SATURATION: 98 % | HEART RATE: 82 BPM | SYSTOLIC BLOOD PRESSURE: 124 MMHG | TEMPERATURE: 99 F | DIASTOLIC BLOOD PRESSURE: 64 MMHG | WEIGHT: 178.38 LBS

## 2025-01-27 DIAGNOSIS — Z01.818 PREOPERATIVE TESTING: ICD-10-CM

## 2025-01-27 DIAGNOSIS — Z13.29 THYROID DISORDER SCREEN: ICD-10-CM

## 2025-01-27 DIAGNOSIS — Z13.220 SCREENING FOR HYPERLIPIDEMIA: ICD-10-CM

## 2025-01-27 DIAGNOSIS — Z13.220 ENCOUNTER FOR LIPID SCREENING FOR CARDIOVASCULAR DISEASE: ICD-10-CM

## 2025-01-27 DIAGNOSIS — Z13.1 SCREENING FOR DIABETES MELLITUS: ICD-10-CM

## 2025-01-27 DIAGNOSIS — Z17.0 MALIGNANT NEOPLASM OF LOWER-INNER QUADRANT OF RIGHT BREAST OF FEMALE, ESTROGEN RECEPTOR POSITIVE: ICD-10-CM

## 2025-01-27 DIAGNOSIS — Z13.6 ENCOUNTER FOR LIPID SCREENING FOR CARDIOVASCULAR DISEASE: ICD-10-CM

## 2025-01-27 DIAGNOSIS — Z76.89 ENCOUNTER TO ESTABLISH CARE: Primary | ICD-10-CM

## 2025-01-27 DIAGNOSIS — C50.311 MALIGNANT NEOPLASM OF LOWER-INNER QUADRANT OF RIGHT BREAST OF FEMALE, ESTROGEN RECEPTOR POSITIVE: ICD-10-CM

## 2025-01-27 LAB
ALBUMIN SERPL BCP-MCNC: 3.9 G/DL (ref 3.5–5.2)
ALP SERPL-CCNC: 51 U/L (ref 40–150)
ALT SERPL W/O P-5'-P-CCNC: 13 U/L (ref 10–44)
ANION GAP SERPL CALC-SCNC: 9 MMOL/L (ref 8–16)
AST SERPL-CCNC: 13 U/L (ref 10–40)
BASOPHILS # BLD AUTO: 0.04 K/UL (ref 0–0.2)
BASOPHILS NFR BLD: 0.8 % (ref 0–1.9)
BILIRUB SERPL-MCNC: 0.9 MG/DL (ref 0.1–1)
BUN SERPL-MCNC: 15 MG/DL (ref 6–20)
CALCIUM SERPL-MCNC: 9.2 MG/DL (ref 8.7–10.5)
CHLORIDE SERPL-SCNC: 109 MMOL/L (ref 95–110)
CHOLEST SERPL-MCNC: 154 MG/DL (ref 120–199)
CHOLEST/HDLC SERPL: 3.8 {RATIO} (ref 2–5)
CO2 SERPL-SCNC: 22 MMOL/L (ref 23–29)
CREAT SERPL-MCNC: 0.7 MG/DL (ref 0.5–1.4)
DIFFERENTIAL METHOD BLD: ABNORMAL
EOSINOPHIL # BLD AUTO: 0.1 K/UL (ref 0–0.5)
EOSINOPHIL NFR BLD: 2.5 % (ref 0–8)
ERYTHROCYTE [DISTWIDTH] IN BLOOD BY AUTOMATED COUNT: 13.6 % (ref 11.5–14.5)
EST. GFR  (NO RACE VARIABLE): >60 ML/MIN/1.73 M^2
ESTIMATED AVG GLUCOSE: 105 MG/DL (ref 68–131)
GLUCOSE SERPL-MCNC: 99 MG/DL (ref 70–110)
HBA1C MFR BLD: 5.3 % (ref 4–5.6)
HCT VFR BLD AUTO: 36.5 % (ref 37–48.5)
HDLC SERPL-MCNC: 41 MG/DL (ref 40–75)
HDLC SERPL: 26.6 % (ref 20–50)
HGB BLD-MCNC: 11.6 G/DL (ref 12–16)
IMM GRANULOCYTES # BLD AUTO: 0.02 K/UL (ref 0–0.04)
IMM GRANULOCYTES NFR BLD AUTO: 0.4 % (ref 0–0.5)
LDLC SERPL CALC-MCNC: 100 MG/DL (ref 63–159)
LYMPHOCYTES # BLD AUTO: 1.5 K/UL (ref 1–4.8)
LYMPHOCYTES NFR BLD: 29.3 % (ref 18–48)
MCH RBC QN AUTO: 27 PG (ref 27–31)
MCHC RBC AUTO-ENTMCNC: 31.8 G/DL (ref 32–36)
MCV RBC AUTO: 85 FL (ref 82–98)
MONOCYTES # BLD AUTO: 0.4 K/UL (ref 0.3–1)
MONOCYTES NFR BLD: 8.2 % (ref 4–15)
NEUTROPHILS # BLD AUTO: 3.1 K/UL (ref 1.8–7.7)
NEUTROPHILS NFR BLD: 58.8 % (ref 38–73)
NONHDLC SERPL-MCNC: 113 MG/DL
NRBC BLD-RTO: 0 /100 WBC
PLATELET # BLD AUTO: 261 K/UL (ref 150–450)
PMV BLD AUTO: 11.3 FL (ref 9.2–12.9)
POTASSIUM SERPL-SCNC: 4.3 MMOL/L (ref 3.5–5.1)
PROT SERPL-MCNC: 7.1 G/DL (ref 6–8.4)
RBC # BLD AUTO: 4.29 M/UL (ref 4–5.4)
SODIUM SERPL-SCNC: 140 MMOL/L (ref 136–145)
TRIGL SERPL-MCNC: 65 MG/DL (ref 30–150)
TSH SERPL DL<=0.005 MIU/L-ACNC: 1.56 UIU/ML (ref 0.4–4)
WBC # BLD AUTO: 5.22 K/UL (ref 3.9–12.7)

## 2025-01-27 PROCEDURE — 3074F SYST BP LT 130 MM HG: CPT | Mod: CPTII,S$GLB,, | Performed by: PHYSICIAN ASSISTANT

## 2025-01-27 PROCEDURE — 3008F BODY MASS INDEX DOCD: CPT | Mod: CPTII,S$GLB,, | Performed by: PHYSICIAN ASSISTANT

## 2025-01-27 PROCEDURE — 99203 OFFICE O/P NEW LOW 30 MIN: CPT | Mod: S$GLB,,, | Performed by: PHYSICIAN ASSISTANT

## 2025-01-27 PROCEDURE — 99999 PR PBB SHADOW E&M-EST. PATIENT-LVL III: CPT | Mod: PBBFAC,,, | Performed by: PHYSICIAN ASSISTANT

## 2025-01-27 PROCEDURE — G2211 COMPLEX E/M VISIT ADD ON: HCPCS | Mod: S$GLB,,, | Performed by: PHYSICIAN ASSISTANT

## 2025-01-27 PROCEDURE — 36415 COLL VENOUS BLD VENIPUNCTURE: CPT | Mod: PN | Performed by: PHYSICIAN ASSISTANT

## 2025-01-27 PROCEDURE — 85025 COMPLETE CBC W/AUTO DIFF WBC: CPT | Performed by: PHYSICIAN ASSISTANT

## 2025-01-27 PROCEDURE — 83036 HEMOGLOBIN GLYCOSYLATED A1C: CPT | Performed by: PHYSICIAN ASSISTANT

## 2025-01-27 PROCEDURE — 80053 COMPREHEN METABOLIC PANEL: CPT | Performed by: PHYSICIAN ASSISTANT

## 2025-01-27 PROCEDURE — 1160F RVW MEDS BY RX/DR IN RCRD: CPT | Mod: CPTII,S$GLB,, | Performed by: PHYSICIAN ASSISTANT

## 2025-01-27 PROCEDURE — 3078F DIAST BP <80 MM HG: CPT | Mod: CPTII,S$GLB,, | Performed by: PHYSICIAN ASSISTANT

## 2025-01-27 PROCEDURE — 80061 LIPID PANEL: CPT | Performed by: PHYSICIAN ASSISTANT

## 2025-01-27 PROCEDURE — 1159F MED LIST DOCD IN RCRD: CPT | Mod: CPTII,S$GLB,, | Performed by: PHYSICIAN ASSISTANT

## 2025-01-27 PROCEDURE — 84443 ASSAY THYROID STIM HORMONE: CPT | Performed by: PHYSICIAN ASSISTANT

## 2025-01-27 NOTE — PROGRESS NOTES
"    [facility]       [address] [place] [address]      Phone: [phone]      Fax: [phone]    Bhakti Kemp PA-C                Office Visit  01/27/2025        Subjective      HPI:  Nakita Vaughn is a 44 y.o. female presents today in clinic for "Establish Care (Preop- Surgery 2/28/2025 Breast Reconstruction. Dr. Pastrana)  ."     CHIEF COMPLAINT:  Patient presents to [facility] and for a pre-operative evaluation for an upcoming breast reconstruction surgery following a double mastectomy with deep flap procedure performed about a year ago.    HPI:  Patient, a 43-year-old female, underwent a double mastectomy with deep flap reconstruction approximately 1 year ago after being diagnosed with breast cancer, right breast. The cancer was detected during her third routine mammogram, with no prior symptoms or signs. Initially, a lumpectomy was considered, but an MRI revealed the tumor was 3 times larger than initially thought, leading to the decision for a double mastectomy.     Following her mastectomy, the patient was prescribed tamoxifen. She had severe side effects, including back spasms, burning sensations in her face, and significant sleep disturbances. Due to these side effects and the relatively low risk of cancer recurrence, she discontinued the medication after consulting with her breast cancer doctor.    Patient is now scheduled for a follow-up reconstructive surgery in about a month. This procedure will involve removing the flap, addressing scar tissue on her abdomen, and performing a breast lift.     Patient has an appointment tomorrow to discuss a potential hysterectomy, which has been suggested as an alternative to tamoxifen for reducing cancer recurrence risk.    Patient denies any other major medical history besides the breast cancer. She did not require radiation or chemotherapy for her breast cancer treatment.    Otherwise, she has been " "feeling well. No acute complaints today. Denies any acute complaints today.  Denies any headaches, chest pain, shortness of breath, abdominal pain, constipation, diarrhea, urinary complaints.       MEDICAL HISTORY:  2024- She was diagnosed with ER+/HI+/HER2- invasive lobular carcinoma of her right breast. She underwent bilateral nipple sparing left mastectomy with right SLNB at [facility] on 6/3/24. 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 It is associated with a 2-5 fold increased risk of cutaneous melanoma".   She has not yet established care with dermatologist but has been referred.     She has had a prior tubal ligation for contraception. She has been pregnant 3 times (G3) and has given birth 3 times (P3), all via .    SOCIAL HISTORY:  Patient is  and has 3 children.               The following were updated and reviewed by myself in the chart: medications, past medical history, past surgical history, family history, social history, and allergies.     Medications:  Current Outpatient Medications on File Prior to Visit   Medication Sig Dispense Refill    albuterol (PROVENTIL/VENTOLIN HFA) 90 mcg/actuation inhaler Inhale 2 puffs into the lungs every 4 (four) hours as needed.      [DISCONTINUED] cephALEXin (KEFLEX) 500 MG capsule Take 500 mg by mouth 3 (three) times daily.      [DISCONTINUED] cyclobenzaprine (FLEXERIL) 10 MG tablet Take 10 mg by mouth every 8 (eight) hours as needed.      [DISCONTINUED] diazePAM (VALIUM) 5 MG tablet Take 1 tablet (5 mg total) by mouth every 6 (six) hours as needed for Anxiety. 2 tablet 0    [DISCONTINUED] gabapentin (NEURONTIN) 100 MG capsule Take 100 mg by mouth.      [DISCONTINUED] ondansetron (ZOFRAN-ODT) 4 MG TbDL Take 4 mg by mouth every 8 (eight) hours as needed.      [DISCONTINUED] " oxyCODONE-acetaminophen (PERCOCET)  mg per tablet Take 1 tablet by mouth.      [DISCONTINUED] tamoxifen (NOLVADEX) 20 MG Tab Take 1 tablet (20 mg total) by mouth once daily. 90 tablet 3     No current facility-administered medications on file prior to visit.        PMHx:  Past Medical History:   Diagnosis Date    Malignant neoplasm of lower-inner quadrant of right breast of female, estrogen receptor positive 2024    Monoallelic mutation of MITF gene     Shortness of breath 2021    Snoring 2021      Patient Active Problem List    Diagnosis Date Noted    Monoallelic mutation of MITF gene 2024    Family history of ovarian cancer 2024    Pain 2024    Decreased range of motion 2024    Decreased strength 2024    Decreased activities of daily living (ADL) 2024    At risk for lymphedema 2024    At risk for self care deficit 2024    Encounter for education 2024    Malignant neoplasm of lower-inner quadrant of right breast of female, estrogen receptor positive 2024    Screening mammogram, encounter for 2021    SOB (shortness of breath) 2021    Chest pain syndrome 2021    Snoring 2021    Blurry vision, bilateral 2016    Generalized headaches 2016        PSHx:  Past Surgical History:   Procedure Laterality Date     SECTION      x2     SECTION WITH TUBAL LIGATION          FHx:  Family History   Problem Relation Name Age of Onset    Diabetes Mother      Hypertension Mother      COPD Mother      Ovarian cancer Maternal Grandmother  63    Cancer Maternal Aunt      Breast cancer Neg Hx      Colon cancer Neg Hx          Social:  Social History     Socioeconomic History    Marital status:    Tobacco Use    Smoking status: Never     Passive exposure: Never    Smokeless tobacco: Never   Substance and Sexual Activity    Alcohol use: No    Drug use: No    Sexual activity: Yes     Partners: Male      Birth control/protection: Other-see comments     Comment: Tubal ligation     Social Drivers of Health     Financial Resource Strain: Patient Declined (1/17/2025)    Received from [facility]    Overall Financial Resource Strain (CARDIA)     Difficulty of Paying Living Expenses: Patient declined   Food Insecurity: Patient Declined (1/17/2025)    Received from [facility]    Hunger Vital Sign     Worried About Running Out of Food in the Last Year: Patient declined     Ran Out of Food in the Last Year: Patient declined   Transportation Needs: Patient Declined (1/17/2025)    Received from [facility]    PRAPARE - Transportation     Lack of Transportation (Medical): Patient declined     Lack of Transportation (Non-Medical): Patient declined   Physical Activity: Patient Declined (1/17/2025)    Received from [facility]    Exercise Vital Sign     Days of Exercise per Week: Patient declined     Minutes of Exercise per Session: Patient declined   Stress: Patient Declined (1/17/2025)    Received from [facility]    [employer] - Occupational Stress Questionnaire     Feeling of Stress : Patient declined   Housing Stability: Patient Declined (1/17/2025)    Received from [facility]    Housing Stability Vital Sign     Unable to Pay for Housing in the Last Year: Patient declined     Homeless in the Last Year: Patient declined        Allergies:  Review of patient's allergies indicates:   Allergen Reactions    Corticosteroids (glucocorticoids) Other (See Comments)     Multiple episodes of passing out        ROS:  Review of Systems   Constitutional:  Negative for activity change, appetite change and unexpected weight change.   HENT:  Negative for trouble swallowing and voice change.    Eyes:  Negative for visual disturbance.   Respiratory:  Negative for shortness of breath.    Cardiovascular:  Negative for chest pain.   Gastrointestinal:  Negative for abdominal pain,  "constipation and diarrhea.   Endocrine: Negative for polydipsia, polyphagia and polyuria.   Genitourinary:  Negative for decreased urine volume and difficulty urinating.   Musculoskeletal:  Negative for arthralgias and myalgias.   Skin:  Negative for rash.   Neurological:  Negative for dizziness, light-headedness and headaches.   Psychiatric/Behavioral:  Negative for dysphoric mood. The patient is not nervous/anxious.           Objective      /64 (BP Location: Right arm, Patient Position: Sitting)   Pulse 82   Temp 98.7 °F (37.1 °C) (Tympanic)   Resp 16   Ht 5' 2" (1.575 m)   Wt 80.9 kg (178 lb 5.6 oz)   LMP 12/30/2024   SpO2 98%   BMI 32.62 kg/m²   Ht Readings from Last 3 Encounters:   01/27/25 5' 2" (1.575 m)   07/17/24 5' 2" (1.575 m)   04/17/24 5' 3" (1.6 m)     Wt Readings from Last 3 Encounters:   01/27/25 80.9 kg (178 lb 5.6 oz)   04/17/24 85.9 kg (189 lb 4.2 oz)   10/06/23 87.4 kg (192 lb 10.9 oz)       PHYSICAL EXAM:  Physical Exam  Vitals and nursing note reviewed.   Constitutional:       General: She is not in acute distress.     Appearance: Normal appearance. She is not ill-appearing.   HENT:      Head: Normocephalic and atraumatic.      Nose: Nose normal.      Mouth/Throat:      Mouth: Mucous membranes are moist.      Pharynx: Oropharynx is clear.   Eyes:      Extraocular Movements: Extraocular movements intact.      Pupils: Pupils are equal, round, and reactive to light.   Cardiovascular:      Rate and Rhythm: Normal rate and regular rhythm.      Pulses: Normal pulses.      Heart sounds: Normal heart sounds.   Pulmonary:      Effort: Pulmonary effort is normal. No respiratory distress.      Breath sounds: Normal breath sounds.   Abdominal:      General: Abdomen is flat. Bowel sounds are normal.      Tenderness: There is no abdominal tenderness.   Musculoskeletal:         General: No deformity or signs of injury. Normal range of motion.      Cervical back: Normal range of motion.   Skin:   "   General: Skin is warm and dry.      Findings: No rash.   Neurological:      General: No focal deficit present.      Mental Status: She is alert.   Psychiatric:         Mood and Affect: Mood normal.          Assessment    1. Encounter to establish care    2. Preoperative testing    3. Malignant neoplasm of lower-inner quadrant of right breast of female, estrogen receptor positive    4. Screening for hyperlipidemia    5. Encounter for lipid screening for cardiovascular disease    6. Thyroid disorder screen    7. Screening for diabetes mellitus          Plan    Nakita was seen today for establish care.    Diagnoses and all orders for this visit:    Encounter to establish care    Preoperative testing  -     CBC W/ AUTO DIFFERENTIAL; Future  -     Comprehensive Metabolic Panel; Future    Malignant neoplasm of lower-inner quadrant of right breast of female, estrogen receptor positive    Screening for hyperlipidemia  -     Lipid Panel; Future    Encounter for lipid screening for cardiovascular disease    Thyroid disorder screen  -     TSH; Future    Screening for diabetes mellitus  -     Hemoglobin A1C; Future    Labs ordered. Followed by specialist for h/o breast cancer.     Pre-op labs ordered, will complete clearance upon receipt.     Otherwise, patient overall doing well.     Has a referral to dermatology placed, recommended to establish care for routine skin examinations.        FOLLOW-UP:  Follow up if symptoms worsen or fail to improve.    I spent a total of 40 minutes face to face and non-face to face on the date of this visit.This includes time preparing to see the patient (eg, review of tests, notes), obtaining and/or reviewing additional history from an independent historian and/or outside medical records, documenting clinical information in the electronic health record, independently interpreting results and/or communicating results to the patient/family/caregiver, or care coordinator.  Visit today included  increased complexity associated with the care of the episodic problem addressed and managing the longitudinal care of the patient due to the serious and/or complex managed problem(s).      Signed by:        Bhakti Kemp PA-C      This note was generated with the assistance of ambient listening technology. Verbal consent was obtained by the patient and accompanying visitor(s) for the recording of patient appointment to facilitate this note. I attest to having reviewed and edited the generated note for accuracy, though some syntax or spelling errors may persist. Please contact the author of this note for any clarification.

## 2025-08-22 ENCOUNTER — OFFICE VISIT (OUTPATIENT)
Dept: PRIMARY CARE CLINIC | Facility: CLINIC | Age: 45
End: 2025-08-22
Payer: COMMERCIAL

## 2025-08-22 VITALS
OXYGEN SATURATION: 97 % | TEMPERATURE: 99 F | WEIGHT: 182.56 LBS | HEIGHT: 62 IN | SYSTOLIC BLOOD PRESSURE: 118 MMHG | HEART RATE: 76 BPM | DIASTOLIC BLOOD PRESSURE: 80 MMHG | BODY MASS INDEX: 33.6 KG/M2

## 2025-08-22 DIAGNOSIS — K59.00 CONSTIPATION, UNSPECIFIED CONSTIPATION TYPE: ICD-10-CM

## 2025-08-22 DIAGNOSIS — R53.83 FATIGUE, UNSPECIFIED TYPE: ICD-10-CM

## 2025-08-22 DIAGNOSIS — R63.5 WEIGHT GAIN: ICD-10-CM

## 2025-08-22 DIAGNOSIS — C50.311 MALIGNANT NEOPLASM OF LOWER-INNER QUADRANT OF RIGHT BREAST OF FEMALE, ESTROGEN RECEPTOR POSITIVE: ICD-10-CM

## 2025-08-22 DIAGNOSIS — G47.9 SLEEP DIFFICULTIES: ICD-10-CM

## 2025-08-22 DIAGNOSIS — Z90.711 S/P PARTIAL HYSTERECTOMY: ICD-10-CM

## 2025-08-22 DIAGNOSIS — Z17.0 MALIGNANT NEOPLASM OF LOWER-INNER QUADRANT OF RIGHT BREAST OF FEMALE, ESTROGEN RECEPTOR POSITIVE: ICD-10-CM

## 2025-08-22 DIAGNOSIS — F41.9 ANXIETY: Primary | ICD-10-CM

## 2025-08-22 DIAGNOSIS — R41.89 BRAIN FOG: ICD-10-CM

## 2025-08-22 PROCEDURE — G2211 COMPLEX E/M VISIT ADD ON: HCPCS | Mod: S$GLB,,, | Performed by: PHYSICIAN ASSISTANT

## 2025-08-22 PROCEDURE — 3044F HG A1C LEVEL LT 7.0%: CPT | Mod: CPTII,S$GLB,, | Performed by: PHYSICIAN ASSISTANT

## 2025-08-22 PROCEDURE — 99215 OFFICE O/P EST HI 40 MIN: CPT | Mod: S$GLB,,, | Performed by: PHYSICIAN ASSISTANT

## 2025-08-22 PROCEDURE — 99999 PR PBB SHADOW E&M-EST. PATIENT-LVL IV: CPT | Mod: PBBFAC,,, | Performed by: PHYSICIAN ASSISTANT

## 2025-08-22 PROCEDURE — 3074F SYST BP LT 130 MM HG: CPT | Mod: CPTII,S$GLB,, | Performed by: PHYSICIAN ASSISTANT

## 2025-08-22 PROCEDURE — 3079F DIAST BP 80-89 MM HG: CPT | Mod: CPTII,S$GLB,, | Performed by: PHYSICIAN ASSISTANT

## 2025-08-22 PROCEDURE — 3008F BODY MASS INDEX DOCD: CPT | Mod: CPTII,S$GLB,, | Performed by: PHYSICIAN ASSISTANT

## 2025-08-22 PROCEDURE — 1159F MED LIST DOCD IN RCRD: CPT | Mod: CPTII,S$GLB,, | Performed by: PHYSICIAN ASSISTANT

## 2025-08-25 ENCOUNTER — LAB VISIT (OUTPATIENT)
Dept: LAB | Facility: HOSPITAL | Age: 45
End: 2025-08-25
Attending: PHYSICIAN ASSISTANT
Payer: COMMERCIAL

## 2025-08-25 ENCOUNTER — PATIENT MESSAGE (OUTPATIENT)
Dept: PRIMARY CARE CLINIC | Facility: CLINIC | Age: 45
End: 2025-08-25
Payer: COMMERCIAL

## 2025-08-25 DIAGNOSIS — F41.9 ANXIETY: ICD-10-CM

## 2025-08-25 DIAGNOSIS — Z17.0 MALIGNANT NEOPLASM OF LOWER-INNER QUADRANT OF RIGHT BREAST OF FEMALE, ESTROGEN RECEPTOR POSITIVE: ICD-10-CM

## 2025-08-25 DIAGNOSIS — R53.83 FATIGUE, UNSPECIFIED TYPE: ICD-10-CM

## 2025-08-25 DIAGNOSIS — R63.5 WEIGHT GAIN: ICD-10-CM

## 2025-08-25 DIAGNOSIS — C50.311 MALIGNANT NEOPLASM OF LOWER-INNER QUADRANT OF RIGHT BREAST OF FEMALE, ESTROGEN RECEPTOR POSITIVE: ICD-10-CM

## 2025-08-25 DIAGNOSIS — Z90.711 S/P PARTIAL HYSTERECTOMY: ICD-10-CM

## 2025-08-25 DIAGNOSIS — R41.89 BRAIN FOG: ICD-10-CM

## 2025-08-25 LAB
25(OH)D3+25(OH)D2 SERPL-MCNC: 22 NG/ML (ref 30–96)
ABSOLUTE EOSINOPHIL (OHS): 0.14 K/UL
ABSOLUTE MONOCYTE (OHS): 0.35 K/UL (ref 0.3–1)
ABSOLUTE NEUTROPHIL COUNT (OHS): 2.49 K/UL (ref 1.8–7.7)
BASOPHILS # BLD AUTO: 0.05 K/UL
BASOPHILS NFR BLD AUTO: 1.1 %
ERYTHROCYTE [DISTWIDTH] IN BLOOD BY AUTOMATED COUNT: 13.7 % (ref 11.5–14.5)
ESTRADIOL SERPL HS-MCNC: 12 PG/ML
HCT VFR BLD AUTO: 35.1 % (ref 37–48.5)
HGB BLD-MCNC: 11.6 GM/DL (ref 12–16)
IMM GRANULOCYTES # BLD AUTO: 0.01 K/UL (ref 0–0.04)
IMM GRANULOCYTES NFR BLD AUTO: 0.2 % (ref 0–0.5)
INSULIN SERPL-ACNC: 8.4 UU/ML
LYMPHOCYTES # BLD AUTO: 1.51 K/UL (ref 1–4.8)
MCH RBC QN AUTO: 27.3 PG (ref 27–31)
MCHC RBC AUTO-ENTMCNC: 33 G/DL (ref 32–36)
MCV RBC AUTO: 83 FL (ref 82–98)
NUCLEATED RBC (/100WBC) (OHS): 0 /100 WBC
PLATELET # BLD AUTO: 245 K/UL (ref 150–450)
PMV BLD AUTO: 11.3 FL (ref 9.2–12.9)
RBC # BLD AUTO: 4.25 M/UL (ref 4–5.4)
RELATIVE EOSINOPHIL (OHS): 3.1 %
RELATIVE LYMPHOCYTE (OHS): 33.2 % (ref 18–48)
RELATIVE MONOCYTE (OHS): 7.7 % (ref 4–15)
RELATIVE NEUTROPHIL (OHS): 54.7 % (ref 38–73)
TSH SERPL-ACNC: 1 UIU/ML (ref 0.4–4)
VIT B12 SERPL-MCNC: 392 PG/ML (ref 210–950)
WBC # BLD AUTO: 4.55 K/UL (ref 3.9–12.7)

## 2025-08-25 PROCEDURE — 82306 VITAMIN D 25 HYDROXY: CPT

## 2025-08-25 PROCEDURE — 84443 ASSAY THYROID STIM HORMONE: CPT

## 2025-08-25 PROCEDURE — 83525 ASSAY OF INSULIN: CPT

## 2025-08-25 PROCEDURE — 85025 COMPLETE CBC W/AUTO DIFF WBC: CPT

## 2025-08-25 PROCEDURE — 36415 COLL VENOUS BLD VENIPUNCTURE: CPT | Mod: PN

## 2025-08-25 PROCEDURE — 82670 ASSAY OF TOTAL ESTRADIOL: CPT

## 2025-08-25 PROCEDURE — 82607 VITAMIN B-12: CPT
